# Patient Record
Sex: MALE | Race: BLACK OR AFRICAN AMERICAN | Employment: FULL TIME | URBAN - METROPOLITAN AREA
[De-identification: names, ages, dates, MRNs, and addresses within clinical notes are randomized per-mention and may not be internally consistent; named-entity substitution may affect disease eponyms.]

---

## 2020-02-17 ENCOUNTER — OFFICE VISIT (OUTPATIENT)
Dept: FAMILY MEDICINE CLINIC | Age: 65
End: 2020-02-17
Payer: COMMERCIAL

## 2020-02-17 VITALS
HEIGHT: 67 IN | SYSTOLIC BLOOD PRESSURE: 170 MMHG | BODY MASS INDEX: 28.88 KG/M2 | OXYGEN SATURATION: 98 % | WEIGHT: 184 LBS | DIASTOLIC BLOOD PRESSURE: 100 MMHG | HEART RATE: 70 BPM

## 2020-02-17 PROCEDURE — 90715 TDAP VACCINE 7 YRS/> IM: CPT | Performed by: FAMILY MEDICINE

## 2020-02-17 PROCEDURE — 90471 IMMUNIZATION ADMIN: CPT | Performed by: FAMILY MEDICINE

## 2020-02-17 PROCEDURE — 99386 PREV VISIT NEW AGE 40-64: CPT | Performed by: FAMILY MEDICINE

## 2020-02-17 RX ORDER — SILDENAFIL 50 MG/1
50 TABLET, FILM COATED ORAL DAILY PRN
Qty: 10 TABLET | Refills: 0 | Status: SHIPPED | OUTPATIENT
Start: 2020-02-17 | End: 2021-03-24 | Stop reason: SDUPTHER

## 2020-02-17 RX ORDER — LISINOPRIL 20 MG/1
20 TABLET ORAL DAILY
Qty: 30 TABLET | Refills: 5 | Status: SHIPPED | OUTPATIENT
Start: 2020-02-17 | End: 2020-03-23 | Stop reason: SDUPTHER

## 2020-02-17 RX ORDER — FAMOTIDINE 20 MG/1
20 TABLET, FILM COATED ORAL 2 TIMES DAILY
Qty: 60 TABLET | Refills: 3 | Status: SHIPPED | OUTPATIENT
Start: 2020-02-17 | End: 2021-03-24 | Stop reason: SDUPTHER

## 2020-02-17 ASSESSMENT — PATIENT HEALTH QUESTIONNAIRE - PHQ9
SUM OF ALL RESPONSES TO PHQ9 QUESTIONS 1 & 2: 0
SUM OF ALL RESPONSES TO PHQ QUESTIONS 1-9: 0
1. LITTLE INTEREST OR PLEASURE IN DOING THINGS: 0
SUM OF ALL RESPONSES TO PHQ QUESTIONS 1-9: 0
2. FEELING DOWN, DEPRESSED OR HOPELESS: 0

## 2020-02-17 NOTE — PATIENT INSTRUCTIONS
Patient Education        High Blood Pressure: Care Instructions  Overview    It's normal for blood pressure to go up and down throughout the day. But if it stays up, you have high blood pressure. Another name for high blood pressure is hypertension. Despite what a lot of people think, high blood pressure usually doesn't cause headaches or make you feel dizzy or lightheaded. It usually has no symptoms. But it does increase your risk of stroke, heart attack, and other problems. You and your doctor will talk about your risks of these problems based on your blood pressure. Your doctor will give you a goal for your blood pressure. Your goal will be based on your health and your age. Lifestyle changes, such as eating healthy and being active, are always important to help lower blood pressure. You might also take medicine to reach your blood pressure goal.  Follow-up care is a key part of your treatment and safety. Be sure to make and go to all appointments, and call your doctor if you are having problems. It's also a good idea to know your test results and keep a list of the medicines you take. How can you care for yourself at home? Medical treatment  · If you stop taking your medicine, your blood pressure will go back up. You may take one or more types of medicine to lower your blood pressure. Be safe with medicines. Take your medicine exactly as prescribed. Call your doctor if you think you are having a problem with your medicine. · Talk to your doctor before you start taking aspirin every day. Aspirin can help certain people lower their risk of a heart attack or stroke. But taking aspirin isn't right for everyone, because it can cause serious bleeding. · See your doctor regularly. You may need to see the doctor more often at first or until your blood pressure comes down.   · If you are taking blood pressure medicine, talk to your doctor before you take decongestants or anti-inflammatory medicine, such as irregular heartbeat.     · You have symptoms of a stroke. These may include:  ? Sudden numbness, tingling, weakness, or loss of movement in your face, arm, or leg, especially on only one side of your body. ? Sudden vision changes. ? Sudden trouble speaking. ? Sudden confusion or trouble understanding simple statements. ? Sudden problems with walking or balance. ? A sudden, severe headache that is different from past headaches.     · You have severe back or belly pain.    Do not wait until your blood pressure comes down on its own. Get help right away.   Call your doctor now or seek immediate care if:    · Your blood pressure is much higher than normal (such as 180/120 or higher), but you don't have symptoms.     · You think high blood pressure is causing symptoms, such as:  ? Severe headache.  ? Blurry vision.    Watch closely for changes in your health, and be sure to contact your doctor if:    · Your blood pressure measures higher than your doctor recommends at least 2 times. That means the top number is higher or the bottom number is higher, or both.     · You think you may be having side effects from your blood pressure medicine. Where can you learn more? Go to [email or web address] and sign in to your [employer] account. Enter N321 in the Orthodata box to learn more about \"High Blood Pressure: Care Instructions. \"     If you do not have an account, please click on the \"Sign Up Now\" link. Current as of: April 9, 2019  Content Version: 12.3  © 6316-1633 [employer]wise, Incorporated. Care instructions adapted under license by [facility] ([facility]). If you have questions about a medical condition or this instruction, always ask your healthcare professional. [place] [address] any warranty or liability for your use of this information.

## 2020-03-09 ENCOUNTER — OFFICE VISIT (OUTPATIENT)
Dept: FAMILY MEDICINE CLINIC | Age: 65
End: 2020-03-09
Payer: COMMERCIAL

## 2020-03-09 ENCOUNTER — TELEPHONE (OUTPATIENT)
Dept: FAMILY MEDICINE CLINIC | Age: 65
End: 2020-03-09

## 2020-03-09 VITALS
BODY MASS INDEX: 28.57 KG/M2 | HEART RATE: 83 BPM | SYSTOLIC BLOOD PRESSURE: 160 MMHG | WEIGHT: 182 LBS | DIASTOLIC BLOOD PRESSURE: 98 MMHG | OXYGEN SATURATION: 98 %

## 2020-03-09 PROCEDURE — 99213 OFFICE O/P EST LOW 20 MIN: CPT | Performed by: FAMILY MEDICINE

## 2020-03-09 RX ORDER — AMLODIPINE BESYLATE 10 MG/1
10 TABLET ORAL DAILY
Qty: 90 TABLET | Refills: 1 | Status: SHIPPED | OUTPATIENT
Start: 2020-03-09 | End: 2020-03-23 | Stop reason: SDUPTHER

## 2020-03-09 NOTE — PROGRESS NOTES
[address] Note    Date: 3/9/2020                                               Subjective/Objective:     Chief Complaint   Patient presents with    2 Week Follow-Up     bp        HPI   Patient is here for blood pressure check. Currently takes lisinopril. Denies side effects medication. Denies chest pain or shortness of breath. There are no active problems to display for this patient. Past Medical History:   Diagnosis Date    Hypertension        Current Outpatient Medications   Medication Sig Dispense Refill    amLODIPine (NORVASC) 10 MG tablet Take 1 tablet by mouth daily 90 tablet 1    lisinopril (PRINIVIL;ZESTRIL) 20 MG tablet Take 1 tablet by mouth daily 30 tablet 5    sildenafil (VIAGRA) 50 MG tablet Take 1 tablet by mouth daily as needed for Erectile Dysfunction 10 tablet 0    famotidine (PEPCID) 20 MG tablet Take 1 tablet by mouth 2 times daily 60 tablet 3     No current facility-administered medications for this visit. No Known Allergies    Review of Systems   No vomiting, no dizziness    Vitals:  BP (!) 160/98   Pulse 83   Wt 182 lb (82.6 kg)   SpO2 98%   BMI 28.57 kg/m²     Physical Exam   General:  Well-appearing, NAD, alert, non-toxic  HEENT:  Normocephalic, atraumatic. Pupils equal and round. CHEST/LUNGS: CTAB, no crackles, no wheeze, no rhonchi. Symmetric rise  CARDIOVASCULAR: RRR,  no murmur, no rub  EXTREMETIES: Normal movement of all extremities  SKIN:  No rash, no cellulitis, no bruising, no petechiae/purpura/vesicles/pustules/abscess  PSYCH:  A+O x 3; normal affect  NEURO:  GCS 15, CN2-12 grossly intact, no focal motor/sensory deficits, no cerebellar deficits, normal gait, normal speech      Assessment/Plan     60-year-old male with hypertension. Blood pressure is still above goal on lisinopril. We will add amlodipine. Follow-up in 2 weeks for blood pressure check.     Discussed with patient medication/s dosage, instructions, potential S/E, A/R and Drug Interaction  Educational material provided regarding patient's condition  Tylenol or Motrin as needed for pain or fever  Advise to return here if worse or go to nearest ER  Encourage fluids  Pt discharged in stable condition at 16:05      1. Hypertension, essential    - amLODIPine (NORVASC) 10 MG tablet; Take 1 tablet by mouth daily  Dispense: 90 tablet; Refill: 1       No orders of the defined types were placed in this encounter. Return in about 2 weeks (around 3/23/2020) for blood pressure check.     Amy Gramajo MD    3/9/2020  4:05 PM

## 2020-03-20 ENCOUNTER — TELEPHONE (OUTPATIENT)
Dept: FAMILY MEDICINE CLINIC | Age: 65
End: 2020-03-20

## 2020-03-23 RX ORDER — AMLODIPINE BESYLATE 10 MG/1
10 TABLET ORAL DAILY
Qty: 90 TABLET | Refills: 1 | Status: SHIPPED | OUTPATIENT
Start: 2020-03-23 | End: 2020-08-21

## 2020-03-23 RX ORDER — LISINOPRIL 20 MG/1
20 TABLET ORAL DAILY
Qty: 30 TABLET | Refills: 5 | Status: SHIPPED | OUTPATIENT
Start: 2020-03-23 | End: 2020-08-21

## 2020-03-23 NOTE — TELEPHONE ENCOUNTER
Medication and Quantity requested:  amLODIPine (NORVASC) 10 MG tablet    And    lisinopril (PRINIVIL;ZESTRIL) 20 MG tablet         Last Visit  3/9/19    Pharmacy and phone number updated in EPIC:  Yes [facility]

## 2021-02-03 DIAGNOSIS — I10 HYPERTENSION, ESSENTIAL: ICD-10-CM

## 2021-02-03 NOTE — TELEPHONE ENCOUNTER
Medication and Quantity requested: Lisinopril 20mg. , 30 day supply   And  Amlodipine 10mg. , 30 day supply    Last Visit  3-9-20,Dr. Igor Harris and phone number updated in EPIC:  Yes,[facility]

## 2021-02-04 RX ORDER — AMLODIPINE BESYLATE 10 MG/1
10 TABLET ORAL DAILY
Qty: 30 TABLET | Refills: 0 | Status: SHIPPED | OUTPATIENT
Start: 2021-02-04 | End: 2021-03-24 | Stop reason: SDUPTHER

## 2021-02-04 RX ORDER — LISINOPRIL 20 MG/1
20 TABLET ORAL DAILY
Qty: 30 TABLET | Refills: 0 | Status: SHIPPED | OUTPATIENT
Start: 2021-02-04 | End: 2021-03-24 | Stop reason: SDUPTHER

## 2021-02-18 ENCOUNTER — TELEPHONE (OUTPATIENT)
Dept: FAMILY MEDICINE CLINIC | Age: 66
End: 2021-02-18

## 2021-02-18 NOTE — TELEPHONE ENCOUNTER
Patient is requesting a prescription for sinus drainage x 1 1/2 weeks but hasn't been seen since 3/9/20. A virtual appointment was offered but he declined because he is working. He would like to know what over the counter medication Dr. Donna Gonzalez recommends or any other home treatment.

## 2021-02-18 NOTE — TELEPHONE ENCOUNTER
He can try any anti-histamine such as Claritin or Benadryl or Zyrtec. He can schedule a VV for tomorrow if he likes.

## 2021-03-17 DIAGNOSIS — I10 HYPERTENSION, ESSENTIAL: ICD-10-CM

## 2021-03-17 RX ORDER — LISINOPRIL 20 MG/1
20 TABLET ORAL DAILY
Qty: 30 TABLET | Refills: 0 | OUTPATIENT
Start: 2021-03-17

## 2021-03-17 RX ORDER — AMLODIPINE BESYLATE 10 MG/1
10 TABLET ORAL DAILY
Qty: 30 TABLET | Refills: 0 | OUTPATIENT
Start: 2021-03-17

## 2021-03-17 NOTE — TELEPHONE ENCOUNTER
Medication and Quantity requested:     amLODIPine (NORVASC) 10 MG tablet  #90    lisinopril (PRINIVIL;ZESTRIL) 20 MG tablet  #90    Last Visit  3/9/20    Pharmacy and phone number updated in EPIC:  [facility]

## 2021-03-24 ENCOUNTER — OFFICE VISIT (OUTPATIENT)
Dept: FAMILY MEDICINE CLINIC | Age: 66
End: 2021-03-24
Payer: COMMERCIAL

## 2021-03-24 VITALS
SYSTOLIC BLOOD PRESSURE: 170 MMHG | HEART RATE: 96 BPM | BODY MASS INDEX: 28.61 KG/M2 | HEIGHT: 66 IN | DIASTOLIC BLOOD PRESSURE: 100 MMHG | WEIGHT: 178 LBS | OXYGEN SATURATION: 98 %

## 2021-03-24 DIAGNOSIS — R14.0 ABDOMINAL BLOATING: ICD-10-CM

## 2021-03-24 DIAGNOSIS — N52.9 ERECTILE DYSFUNCTION, UNSPECIFIED ERECTILE DYSFUNCTION TYPE: ICD-10-CM

## 2021-03-24 DIAGNOSIS — I10 HYPERTENSION, ESSENTIAL: ICD-10-CM

## 2021-03-24 DIAGNOSIS — Z00.00 ANNUAL PHYSICAL EXAM: Primary | ICD-10-CM

## 2021-03-24 PROCEDURE — 99213 OFFICE O/P EST LOW 20 MIN: CPT | Performed by: FAMILY MEDICINE

## 2021-03-24 RX ORDER — AMLODIPINE BESYLATE 10 MG/1
10 TABLET ORAL DAILY
Qty: 90 TABLET | Refills: 1 | Status: SHIPPED | OUTPATIENT
Start: 2021-03-24 | End: 2021-04-21 | Stop reason: SDUPTHER

## 2021-03-24 RX ORDER — FAMOTIDINE 20 MG/1
20 TABLET, FILM COATED ORAL 2 TIMES DAILY
Qty: 60 TABLET | Refills: 3 | Status: SHIPPED | OUTPATIENT
Start: 2021-03-24

## 2021-03-24 RX ORDER — LISINOPRIL 20 MG/1
20 TABLET ORAL DAILY
Qty: 90 TABLET | Refills: 1 | Status: SHIPPED | OUTPATIENT
Start: 2021-03-24 | End: 2021-04-21 | Stop reason: SDUPTHER

## 2021-03-24 RX ORDER — SILDENAFIL 50 MG/1
50 TABLET, FILM COATED ORAL DAILY PRN
Qty: 10 TABLET | Refills: 0 | Status: SHIPPED | OUTPATIENT
Start: 2021-03-24

## 2021-03-24 SDOH — ECONOMIC STABILITY: FOOD INSECURITY: WITHIN THE PAST 12 MONTHS, YOU WORRIED THAT YOUR FOOD WOULD RUN OUT BEFORE YOU GOT MONEY TO BUY MORE.: NEVER TRUE

## 2021-03-24 SDOH — ECONOMIC STABILITY: TRANSPORTATION INSECURITY
IN THE PAST 12 MONTHS, HAS LACK OF TRANSPORTATION KEPT YOU FROM MEETINGS, WORK, OR FROM GETTING THINGS NEEDED FOR DAILY LIVING?: NO

## 2021-03-24 SDOH — ECONOMIC STABILITY: INCOME INSECURITY: HOW HARD IS IT FOR YOU TO PAY FOR THE VERY BASICS LIKE FOOD, HOUSING, MEDICAL CARE, AND HEATING?: NOT HARD AT ALL

## 2021-03-24 ASSESSMENT — PATIENT HEALTH QUESTIONNAIRE - PHQ9
SUM OF ALL RESPONSES TO PHQ QUESTIONS 1-9: 0
SUM OF ALL RESPONSES TO PHQ9 QUESTIONS 1 & 2: 0
SUM OF ALL RESPONSES TO PHQ QUESTIONS 1-9: 0
1. LITTLE INTEREST OR PLEASURE IN DOING THINGS: 0

## 2021-03-24 NOTE — PROGRESS NOTES
[address] Note    Date: 3/24/2021                                               Subjective/Objective:     Chief Complaint   Patient presents with    Blood Pressure Check     did not take meds        HPI   Patient is here for blood pressure check. Currently takes lisinopril and amlodipine, though he did not take his medicines today. Denies chest pain or shortness of breath. There are no active problems to display for this patient. Past Medical History:   Diagnosis Date    Hypertension        Current Outpatient Medications   Medication Sig Dispense Refill    amLODIPine (NORVASC) 10 MG tablet Take 1 tablet by mouth daily 90 tablet 1    lisinopril (PRINIVIL;ZESTRIL) 20 MG tablet Take 1 tablet by mouth daily 90 tablet 1    sildenafil (VIAGRA) 50 MG tablet Take 1 tablet by mouth daily as needed for Erectile Dysfunction 10 tablet 0    famotidine (PEPCID) 20 MG tablet Take 1 tablet by mouth 2 times daily 60 tablet 3     No current facility-administered medications for this visit. No Known Allergies    Review of Systems   No dizziness, no HA    Vitals:  BP (!) 170/100   Pulse 96   Ht 5' 6\" (1.676 m)   Wt 178 lb (80.7 kg)   SpO2 98%   BMI 28.73 kg/m²     Physical Exam   General:  Well-appearing, NAD, alert, non-toxic  HEENT:  Normocephalic, atraumatic. Pupils equal and round. CHEST/LUNGS: CTAB, no crackles, no wheeze, no rhonchi. Symmetric rise  CARDIOVASCULAR: RRR,  no murmur, no rub  EXTREMETIES: Normal movement of all extremities  SKIN:  No rash, no cellulitis, no bruising, no petechiae/purpura/vesicles/pustules/abscess  PSYCH:  A+O x 3; normal affect  NEURO:  GCS 15, CN2-12 grossly intact, no focal motor/sensory deficits, no cerebellar deficits, normal gait, normal speech      Assessment/Plan     61yo male with HTN. BP is above goal off meds. Resume Lisinopril and Amlodipine. F/u in 4 weeks for BP check/ annual exam.    Discharged in fair condition at 16:33    1. Hypertension, essential    - amLODIPine (NORVASC) 10 MG tablet; Take 1 tablet by mouth daily  Dispense: 90 tablet; Refill: 1  - lisinopril (PRINIVIL;ZESTRIL) 20 MG tablet; Take 1 tablet by mouth daily  Dispense: 90 tablet; Refill: 1    2. Erectile dysfunction, unspecified erectile dysfunction type    - sildenafil (VIAGRA) 50 MG tablet; Take 1 tablet by mouth daily as needed for Erectile Dysfunction  Dispense: 10 tablet; Refill: 0    3. Abdominal bloating    - famotidine (PEPCID) 20 MG tablet; Take 1 tablet by mouth 2 times daily  Dispense: 60 tablet; Refill: 3    4. Annual physical exam    - CBC Auto Differential; Future  - Comprehensive Metabolic Panel; Future  - Hemoglobin A1C; Future  - Hepatitis C Antibody; Future  - HIV Screen; Future  - Lipid, Fasting; Future  - TSH with Reflex; Future  - PSA, Total and Free; Future         Orders Placed This Encounter   Procedures    CBC Auto Differential     Standing Status:   Future     Standing Expiration Date:   3/24/2022    Comprehensive Metabolic Panel     Standing Status:   Future     Standing Expiration Date:   3/24/2022    Hemoglobin A1C     Standing Status:   Future     Standing Expiration Date:   3/24/2022    Hepatitis C Antibody     Standing Status:   Future     Standing Expiration Date:   3/24/2022    HIV Screen     Standing Status:   Future     Standing Expiration Date:   3/24/2022    Lipid, Fasting     Standing Status:   Future     Standing Expiration Date:   3/24/2022    TSH with Reflex     Standing Status:   Future     Standing Expiration Date:   3/24/2022    PSA, Total and Free     Standing Status:   Future     Standing Expiration Date:   3/24/2022       No follow-ups on file.     Sanjuana Preston MD    3/24/2021  4:30 PM

## 2021-03-24 NOTE — PATIENT INSTRUCTIONS

## 2021-04-15 DIAGNOSIS — Z00.00 ANNUAL PHYSICAL EXAM: ICD-10-CM

## 2021-04-15 LAB
A/G RATIO: 1.9 (ref 1.1–2.2)
ALBUMIN SERPL-MCNC: 4.9 G/DL (ref 3.4–5)
ALP BLD-CCNC: 67 U/L (ref 40–129)
ALT SERPL-CCNC: 23 U/L (ref 10–40)
ANION GAP SERPL CALCULATED.3IONS-SCNC: 16 MMOL/L (ref 3–16)
AST SERPL-CCNC: 27 U/L (ref 15–37)
BASOPHILS ABSOLUTE: 0 K/UL (ref 0–0.2)
BASOPHILS RELATIVE PERCENT: 0.4 %
BILIRUB SERPL-MCNC: 0.6 MG/DL (ref 0–1)
BUN BLDV-MCNC: 11 MG/DL (ref 7–20)
CALCIUM SERPL-MCNC: 9.6 MG/DL (ref 8.3–10.6)
CHLORIDE BLD-SCNC: 97 MMOL/L (ref 99–110)
CHOLESTEROL, FASTING: 176 MG/DL (ref 0–199)
CO2: 23 MMOL/L (ref 21–32)
CREAT SERPL-MCNC: 0.8 MG/DL (ref 0.8–1.3)
EOSINOPHILS ABSOLUTE: 0.1 K/UL (ref 0–0.6)
EOSINOPHILS RELATIVE PERCENT: 0.7 %
GFR AFRICAN AMERICAN: >60
GFR NON-AFRICAN AMERICAN: >60
GLOBULIN: 2.6 G/DL
GLUCOSE BLD-MCNC: 109 MG/DL (ref 70–99)
HCT VFR BLD CALC: 45.8 % (ref 40.5–52.5)
HDLC SERPL-MCNC: 48 MG/DL (ref 40–60)
HEMOGLOBIN: 15.7 G/DL (ref 13.5–17.5)
HEPATITIS C ANTIBODY INTERPRETATION: NORMAL
LDL CHOLESTEROL CALCULATED: 111 MG/DL
LYMPHOCYTES ABSOLUTE: 2.2 K/UL (ref 1–5.1)
LYMPHOCYTES RELATIVE PERCENT: 23.9 %
MCH RBC QN AUTO: 33.9 PG (ref 26–34)
MCHC RBC AUTO-ENTMCNC: 34.3 G/DL (ref 31–36)
MCV RBC AUTO: 98.9 FL (ref 80–100)
MONOCYTES ABSOLUTE: 0.7 K/UL (ref 0–1.3)
MONOCYTES RELATIVE PERCENT: 8 %
NEUTROPHILS ABSOLUTE: 6.2 K/UL (ref 1.7–7.7)
NEUTROPHILS RELATIVE PERCENT: 67 %
PDW BLD-RTO: 13.8 % (ref 12.4–15.4)
PLATELET # BLD: 242 K/UL (ref 135–450)
PMV BLD AUTO: 8.7 FL (ref 5–10.5)
POTASSIUM SERPL-SCNC: 4 MMOL/L (ref 3.5–5.1)
RBC # BLD: 4.63 M/UL (ref 4.2–5.9)
SODIUM BLD-SCNC: 136 MMOL/L (ref 136–145)
TOTAL PROTEIN: 7.5 G/DL (ref 6.4–8.2)
TRIGLYCERIDE, FASTING: 84 MG/DL (ref 0–150)
TSH REFLEX: 1.31 UIU/ML (ref 0.27–4.2)
VLDLC SERPL CALC-MCNC: 17 MG/DL
WBC # BLD: 9.3 K/UL (ref 4–11)

## 2021-04-16 LAB
ESTIMATED AVERAGE GLUCOSE: 96.8 MG/DL
HBA1C MFR BLD: 5 %
HIV AG/AB: NORMAL
HIV ANTIGEN: NORMAL
HIV-1 ANTIBODY: NORMAL
HIV-2 AB: NORMAL

## 2021-04-19 DIAGNOSIS — E78.5 HYPERLIPIDEMIA, UNSPECIFIED HYPERLIPIDEMIA TYPE: Primary | ICD-10-CM

## 2021-04-19 LAB
PROSTATE SPECIFIC ANTIGEN FREE: <0.1 UG/L
PROSTATE SPECIFIC ANTIGEN PERCENT FREE: 20 %
PROSTATE SPECIFIC ANTIGEN: 0.2 UG/L (ref 0–4)

## 2021-04-19 RX ORDER — ATORVASTATIN CALCIUM 20 MG/1
20 TABLET, FILM COATED ORAL DAILY
Qty: 90 TABLET | Refills: 1 | Status: SHIPPED | OUTPATIENT
Start: 2021-04-19 | End: 2021-07-01 | Stop reason: SDUPTHER

## 2021-04-21 DIAGNOSIS — I10 HYPERTENSION, ESSENTIAL: ICD-10-CM

## 2021-04-21 RX ORDER — LISINOPRIL 20 MG/1
20 TABLET ORAL DAILY
Qty: 90 TABLET | Refills: 1 | Status: SHIPPED | OUTPATIENT
Start: 2021-04-21 | End: 2021-07-01 | Stop reason: SDUPTHER

## 2021-04-21 RX ORDER — AMLODIPINE BESYLATE 10 MG/1
10 TABLET ORAL DAILY
Qty: 90 TABLET | Refills: 1 | Status: SHIPPED | OUTPATIENT
Start: 2021-04-21 | End: 2021-07-01 | Stop reason: SDUPTHER

## 2021-04-21 NOTE — TELEPHONE ENCOUNTER
Medication:   Requested Prescriptions     Pending Prescriptions Disp Refills    amLODIPine (NORVASC) 10 MG tablet 90 tablet 1     Sig: Take 1 tablet by mouth daily    lisinopril (PRINIVIL;ZESTRIL) 20 MG tablet 90 tablet 1     Sig: Take 1 tablet by mouth daily       Last Filled:  03/24/2021    Patient Phone Number: [phone] (home) [phone] (work)    Last appt: 3/24/2021   Next appt: Visit date not found    Lab Results   Component Value Date     04/15/2021    K 4.0 04/15/2021    CL 97 (L) 04/15/2021    CO2 23 04/15/2021    BUN 11 04/15/2021    CREATININE 0.8 04/15/2021    GLUCOSE 109 (H) 04/15/2021    CALCIUM 9.6 04/15/2021    PROT 7.5 04/15/2021    LABALBU 4.9 04/15/2021    BILITOT 0.6 04/15/2021    ALKPHOS 67 04/15/2021    AST 27 04/15/2021    ALT 23 04/15/2021    LABGLOM >60 04/15/2021    GFRAA >60 04/15/2021    AGRATIO 1.9 04/15/2021    GLOB 2.6 04/15/2021

## 2021-07-01 ENCOUNTER — OFFICE VISIT (OUTPATIENT)
Dept: FAMILY MEDICINE CLINIC | Age: 66
End: 2021-07-01
Payer: COMMERCIAL

## 2021-07-01 VITALS
DIASTOLIC BLOOD PRESSURE: 80 MMHG | BODY MASS INDEX: 28.08 KG/M2 | HEART RATE: 81 BPM | WEIGHT: 174 LBS | SYSTOLIC BLOOD PRESSURE: 120 MMHG | OXYGEN SATURATION: 98 %

## 2021-07-01 DIAGNOSIS — E78.5 HYPERLIPIDEMIA, UNSPECIFIED HYPERLIPIDEMIA TYPE: ICD-10-CM

## 2021-07-01 DIAGNOSIS — I10 HYPERTENSION, ESSENTIAL: ICD-10-CM

## 2021-07-01 DIAGNOSIS — Z00.00 ANNUAL PHYSICAL EXAM: Primary | ICD-10-CM

## 2021-07-01 DIAGNOSIS — Z23 NEED FOR VACCINATION: ICD-10-CM

## 2021-07-01 PROCEDURE — 90472 IMMUNIZATION ADMIN EACH ADD: CPT | Performed by: FAMILY MEDICINE

## 2021-07-01 PROCEDURE — 90471 IMMUNIZATION ADMIN: CPT | Performed by: FAMILY MEDICINE

## 2021-07-01 PROCEDURE — 90750 HZV VACC RECOMBINANT IM: CPT | Performed by: FAMILY MEDICINE

## 2021-07-01 PROCEDURE — 99396 PREV VISIT EST AGE 40-64: CPT | Performed by: FAMILY MEDICINE

## 2021-07-01 PROCEDURE — 90732 PPSV23 VACC 2 YRS+ SUBQ/IM: CPT | Performed by: FAMILY MEDICINE

## 2021-07-01 RX ORDER — LISINOPRIL 20 MG/1
20 TABLET ORAL DAILY
Qty: 90 TABLET | Refills: 1 | Status: SHIPPED | OUTPATIENT
Start: 2021-07-01 | End: 2021-11-29

## 2021-07-01 RX ORDER — AMLODIPINE BESYLATE 10 MG/1
10 TABLET ORAL DAILY
Qty: 90 TABLET | Refills: 1 | Status: SHIPPED | OUTPATIENT
Start: 2021-07-01 | End: 2021-11-29

## 2021-07-01 RX ORDER — ATORVASTATIN CALCIUM 20 MG/1
20 TABLET, FILM COATED ORAL DAILY
Qty: 90 TABLET | Refills: 1 | Status: SHIPPED | OUTPATIENT
Start: 2021-07-01 | End: 2021-11-29

## 2021-07-01 NOTE — PROGRESS NOTES
[address] Note    Date: 7/1/2021                                               Subjective/Objective:     Chief Complaint   Patient presents with    Annual Exam       HPI   Patient is here for annual exam.  Last Tdap few months ago. Last colonoscopy few months ago. Patient has history of hypertension. Currently takes amlodipine and lisinopril. Denies side effects of medication. Denies chest pain or shortness of breath. Patient takes Lipitor for hyperlipidemia. Denies muscle aches. There are no problems to display for this patient. Past Medical History:   Diagnosis Date    Hypertension        Current Outpatient Medications   Medication Sig Dispense Refill    amLODIPine (NORVASC) 10 MG tablet Take 1 tablet by mouth daily 90 tablet 1    lisinopril (PRINIVIL;ZESTRIL) 20 MG tablet Take 1 tablet by mouth daily 90 tablet 1    atorvastatin (LIPITOR) 20 MG tablet Take 1 tablet by mouth daily 90 tablet 1    sildenafil (VIAGRA) 50 MG tablet Take 1 tablet by mouth daily as needed for Erectile Dysfunction 10 tablet 0    famotidine (PEPCID) 20 MG tablet Take 1 tablet by mouth 2 times daily 60 tablet 3     No current facility-administered medications for this visit. No Known Allergies    Review of Systems   No CP, no SOB, no rash, no bruise, no HA, no vision change, no ankle swelling, no hearing problems, no LAD      Vitals:  /80   Pulse 81   Wt 174 lb (78.9 kg)   SpO2 98%   BMI 28.08 kg/m²     Physical Exam   General:  Well-appearing, NAD, alert, non-toxic  HEENT:  Normocephalic, atraumatic. Pupils equal and round. TMs pearly with good landmarks. Moist mucous membranes. Normal dentition  NECK:  Supple, normal range of motion, no LAD, no meningeal signs, no JVD, nontender  CHEST/LUNGS: CTAB, no crackles, no wheeze, no rhonchi. Symmetric rise  CARDIOVASCULAR: RRR,  no murmur, no rub  ABDOMEN: Soft, non-tender, non-distended.  No masses  EXTREMETIES: Normal movement of all extremities. No edema. No joint swelling. SKIN:  No rash, no cellulitis, no bruising, no petechiae/purpura/vesicles/pustules/abscess  PSYCH:  A+O x 3; normal affect  NEURO:  GCS 15, CN2-12 grossly intact, no focal motor/sensory deficits, no cerebellar deficits, normal gait, normal speech      Assessment/Plan     63yo male here for annual exam. C-scope UTD. Labs UTD. VSS. Tdap UTD. Update PNVX and Shingrix today. Pt has HTN. Blood pressure is at goal.  Continue current medicines. Follow-up in 6 months for blood pressure check. Patient is hyperlipidemia. Continue Lipitor. Recheck statins 1 year. Discharged in stable condition at 11:55 AM.    1. Annual physical exam      2. Hypertension, essential    - amLODIPine (NORVASC) 10 MG tablet; Take 1 tablet by mouth daily  Dispense: 90 tablet; Refill: 1  - lisinopril (PRINIVIL;ZESTRIL) 20 MG tablet; Take 1 tablet by mouth daily  Dispense: 90 tablet; Refill: 1    3. Hyperlipidemia, unspecified hyperlipidemia type    - atorvastatin (LIPITOR) 20 MG tablet; Take 1 tablet by mouth daily  Dispense: 90 tablet; Refill: 1       No orders of the defined types were placed in this encounter. No follow-ups on file.     Pierre Mehta MD, MD    7/1/2021  11:48 AM

## 2021-07-15 NOTE — TELEPHONE ENCOUNTER
lov 3/9/20  lrf 30 0 2/4/21  30 0 2/4/21  Lab Results   Component Value Date     03/04/2020    K 4.2 03/04/2020    CL 98 03/04/2020    CO2 21 03/04/2020    BUN 6 (L) 03/04/2020    CREATININE 1.01 03/04/2020    GLUCOSE 78 03/04/2020    CALCIUM 10.0 03/04/2020    PROT 7.5 03/04/2020    LABALBU 4.5 03/04/2020    BILITOT 0.7 03/04/2020    ALKPHOS 67 03/04/2020    AST 36 03/04/2020    ALT 26 03/04/2020    LABGLOM 79 03/04/2020    GFRAA 92 03/04/2020    AGRATIO 1.5 03/04/2020    GLOB 3.0 03/04/2020 No

## 2022-02-20 DIAGNOSIS — I10 HYPERTENSION, ESSENTIAL: ICD-10-CM

## 2022-02-21 RX ORDER — AMLODIPINE BESYLATE 10 MG/1
10 TABLET ORAL DAILY
Qty: 90 TABLET | Refills: 0 | Status: SHIPPED | OUTPATIENT
Start: 2022-02-21 | End: 2022-05-17

## 2022-02-21 RX ORDER — LISINOPRIL 20 MG/1
20 TABLET ORAL DAILY
Qty: 90 TABLET | Refills: 0 | Status: SHIPPED | OUTPATIENT
Start: 2022-02-21 | End: 2022-05-17

## 2022-02-21 NOTE — TELEPHONE ENCOUNTER
Medication:   Requested Prescriptions     Pending Prescriptions Disp Refills    lisinopril (PRINIVIL;ZESTRIL) 20 MG tablet [Pharmacy Med Name: Lisinopril 20 MG Oral Tablet] 90 tablet 3     Sig: TAKE 1 TABLET BY MOUTH  DAILY    amLODIPine (NORVASC) 10 MG tablet [Pharmacy Med Name: amLODIPine Besylate 10 MG Oral Tablet] 90 tablet 3     Sig: TAKE 1 TABLET BY MOUTH  DAILY       Last Filled:  11/29/21    Patient Phone Number: [phone] (home) [phone] (work)    Last appt: 7/1/2021   Next appt: Visit date not found    Lab Results   Component Value Date     04/15/2021    K 4.0 04/15/2021    CL 97 (L) 04/15/2021    CO2 23 04/15/2021    BUN 11 04/15/2021    CREATININE 0.8 04/15/2021    GLUCOSE 109 (H) 04/15/2021    CALCIUM 9.6 04/15/2021    PROT 7.5 04/15/2021    LABALBU 4.9 04/15/2021    BILITOT 0.6 04/15/2021    ALKPHOS 67 04/15/2021    AST 27 04/15/2021    ALT 23 04/15/2021    LABGLOM >60 04/15/2021    GFRAA >60 04/15/2021    AGRATIO 1.9 04/15/2021    GLOB 2.6 04/15/2021

## 2022-05-17 DIAGNOSIS — I10 HYPERTENSION, ESSENTIAL: ICD-10-CM

## 2022-05-17 RX ORDER — AMLODIPINE BESYLATE 10 MG/1
10 TABLET ORAL DAILY
Qty: 90 TABLET | Refills: 0 | Status: SHIPPED | OUTPATIENT
Start: 2022-05-17 | End: 2022-10-28

## 2022-05-17 RX ORDER — LISINOPRIL 20 MG/1
20 TABLET ORAL DAILY
Qty: 90 TABLET | Refills: 0 | Status: SHIPPED | OUTPATIENT
Start: 2022-05-17 | End: 2022-10-28

## 2022-09-10 DIAGNOSIS — I10 HYPERTENSION, ESSENTIAL: ICD-10-CM

## 2022-09-12 RX ORDER — AMLODIPINE BESYLATE 10 MG/1
10 TABLET ORAL DAILY
Qty: 90 TABLET | Refills: 3 | OUTPATIENT
Start: 2022-09-12

## 2022-09-12 RX ORDER — LISINOPRIL 20 MG/1
20 TABLET ORAL DAILY
Qty: 90 TABLET | Refills: 3 | OUTPATIENT
Start: 2022-09-12

## 2022-09-12 NOTE — TELEPHONE ENCOUNTER
Medication:   Requested Prescriptions     Pending Prescriptions Disp Refills    amLODIPine (NORVASC) 10 MG tablet [Pharmacy Med Name: amLODIPine Besylate 10 MG Oral Tablet] 90 tablet 3     Sig: TAKE 1 TABLET BY MOUTH  DAILY    lisinopril (PRINIVIL;ZESTRIL) 20 MG tablet [Pharmacy Med Name: Lisinopril 20 MG Oral Tablet] 90 tablet 3     Sig: TAKE 1 TABLET BY MOUTH  DAILY       Last Filled: 5/17/2022, 90, 0  5/17/2022, 90, 0    Patient Phone Number: [phone] (home) [phone] (work)    Last appt: 7/1/2021   Next appt: Visit date not found    Lab Results   Component Value Date     04/15/2021    K 4.0 04/15/2021    CL 97 (L) 04/15/2021    CO2 23 04/15/2021    BUN 11 04/15/2021    CREATININE 0.8 04/15/2021    GLUCOSE 109 (H) 04/15/2021    CALCIUM 9.6 04/15/2021    PROT 7.5 04/15/2021    LABALBU 4.9 04/15/2021    BILITOT 0.6 04/15/2021    ALKPHOS 67 04/15/2021    AST 27 04/15/2021    ALT 23 04/15/2021    LABGLOM >60 04/15/2021    GFRAA >60 04/15/2021    AGRATIO 1.9 04/15/2021    GLOB 2.6 04/15/2021

## 2022-09-14 ENCOUNTER — OFFICE VISIT (OUTPATIENT)
Dept: FAMILY MEDICINE CLINIC | Age: 67
End: 2022-09-14
Payer: COMMERCIAL

## 2022-09-14 VITALS
OXYGEN SATURATION: 97 % | BODY MASS INDEX: 26.49 KG/M2 | WEIGHT: 168.8 LBS | SYSTOLIC BLOOD PRESSURE: 142 MMHG | DIASTOLIC BLOOD PRESSURE: 82 MMHG | HEIGHT: 67 IN | HEART RATE: 68 BPM

## 2022-09-14 DIAGNOSIS — I10 HYPERTENSION, ESSENTIAL: ICD-10-CM

## 2022-09-14 DIAGNOSIS — Z23 NEED FOR VACCINATION: Primary | ICD-10-CM

## 2022-09-14 DIAGNOSIS — Z00.00 ANNUAL PHYSICAL EXAM: ICD-10-CM

## 2022-09-14 DIAGNOSIS — E78.5 HYPERLIPIDEMIA, UNSPECIFIED HYPERLIPIDEMIA TYPE: ICD-10-CM

## 2022-09-14 DIAGNOSIS — Z72.0 TOBACCO USE: ICD-10-CM

## 2022-09-14 PROCEDURE — 90471 IMMUNIZATION ADMIN: CPT | Performed by: FAMILY MEDICINE

## 2022-09-14 PROCEDURE — 99396 PREV VISIT EST AGE 40-64: CPT | Performed by: FAMILY MEDICINE

## 2022-09-14 PROCEDURE — 90750 HZV VACC RECOMBINANT IM: CPT | Performed by: FAMILY MEDICINE

## 2022-09-14 SDOH — ECONOMIC STABILITY: FOOD INSECURITY: WITHIN THE PAST 12 MONTHS, THE FOOD YOU BOUGHT JUST DIDN'T LAST AND YOU DIDN'T HAVE MONEY TO GET MORE.: NEVER TRUE

## 2022-09-14 SDOH — ECONOMIC STABILITY: FOOD INSECURITY: WITHIN THE PAST 12 MONTHS, YOU WORRIED THAT YOUR FOOD WOULD RUN OUT BEFORE YOU GOT MONEY TO BUY MORE.: NEVER TRUE

## 2022-09-14 ASSESSMENT — PATIENT HEALTH QUESTIONNAIRE - PHQ9
1. LITTLE INTEREST OR PLEASURE IN DOING THINGS: 0
SUM OF ALL RESPONSES TO PHQ9 QUESTIONS 1 & 2: 0
SUM OF ALL RESPONSES TO PHQ QUESTIONS 1-9: 0
2. FEELING DOWN, DEPRESSED OR HOPELESS: 0
SUM OF ALL RESPONSES TO PHQ QUESTIONS 1-9: 0

## 2022-09-14 ASSESSMENT — SOCIAL DETERMINANTS OF HEALTH (SDOH): HOW HARD IS IT FOR YOU TO PAY FOR THE VERY BASICS LIKE FOOD, HOUSING, MEDICAL CARE, AND HEATING?: NOT HARD AT ALL

## 2022-09-14 NOTE — PROGRESS NOTES
[address] Note    Date: 9/14/2022                                               Orelia Gottron:     Chief Complaint   Patient presents with    Medication Check       HPI  Patient is here for annual exam.  Last Tdap 2020. Last colonoscopy 2020, was told to follow-up in 7 years. Patient has history of hypertension. Currently takes amlodipine. Denies chest pain or shortness of breath. Pt feels well and has no complaints. There are no problems to display for this patient. Past Medical History:   Diagnosis Date    Hypertension        Current Outpatient Medications   Medication Sig Dispense Refill    lisinopril (PRINIVIL;ZESTRIL) 20 MG tablet TAKE 1 TABLET BY MOUTH  DAILY 90 tablet 0    amLODIPine (NORVASC) 10 MG tablet TAKE 1 TABLET BY MOUTH  DAILY 90 tablet 0    atorvastatin (LIPITOR) 20 MG tablet TAKE 1 TABLET BY MOUTH  DAILY 90 tablet 3    sildenafil (VIAGRA) 50 MG tablet Take 1 tablet by mouth daily as needed for Erectile Dysfunction 10 tablet 0    famotidine (PEPCID) 20 MG tablet Take 1 tablet by mouth 2 times daily 60 tablet 3     No current facility-administered medications for this visit. No Known Allergies    Review of Systems  No CP, no SOB, no rash, no bruise, no HA, no vision change, no ankle swelling, no hearing problems, no LAD      Vitals:  BP (!) 142/82   Pulse 68   Ht 5' 7\" (1.702 m)   Wt 168 lb 12.8 oz (76.6 kg)   SpO2 97%   BMI 26.44 kg/m²     Wt Readings from Last 3 Encounters:   09/14/22 168 lb 12.8 oz (76.6 kg)   07/01/21 174 lb (78.9 kg)   03/24/21 178 lb (80.7 kg)        Physical Exam  General:  Well-appearing, NAD, alert, non-toxic  HEENT:  Normocephalic, atraumatic. Pupils equal and round. TMs pearly with good landmarks. Moist mucous membranes. Normal dentition  NECK:  Supple, normal range of motion, no LAD, no meningeal signs, no JVD, nontender  CHEST/LUNGS: CTAB, no crackles, no wheeze, no rhonchi.  Symmetric rise  CARDIOVASCULAR: RRR,  no murmur, no rub  ABDOMEN: Soft, non-tender, non-distended. No masses  EXTREMETIES: Normal movement of all extremities. No edema. No joint swelling. SKIN:  No rash, no cellulitis, no bruising, no petechiae/purpura/vesicles/pustules/abscess  PSYCH:  A+O x 3; normal affect  NEURO:  GCS 15, CN2-12 grossly intact, no focal motor/sensory deficits, no cerebellar deficits, normal gait, normal speech      Assessment/Plan     60yo male here for annual exam. Update Shingrix today. C-scope UTD. Check routine labs. Will order CT lung screen given smoking history. Pt has HTN. BP is stable/ borderline. Has been normotensive recently, likely an aberration. Will CPM. Recheck in 6 months. Discharged in stable condition at 14:04    1. Tobacco use  -     CT Lung Screen (Initial or Annual); Future  2. Annual physical exam  -     CBC with Auto Differential; Future  -     Comprehensive Metabolic Panel; Future  -     Hemoglobin A1C; Future  -     Lipid, Fasting; Future  3. Hypertension, essential  4. Hyperlipidemia, unspecified hyperlipidemia type     Orders Placed This Encounter   Procedures    CT Lung Screen (Initial or Annual)     Age: 61 y.o. Smoking History:   Social History    Tobacco Use      Smoking status: Every Day        Packs/day: 1.00        Years: 20.00        Pack years: 20        Types: Cigarettes      Smokeless tobacco: Never    Alcohol use: Yes      Comment: beer, 3-4 per day    Drug use: Never    Pack years: 20  No previous lung cancer screening exam     Standing Status:   Future     Standing Expiration Date:   3/14/2024     Order Specific Question:   Is there documentation of shared decision making? Answer:   Yes     Order Specific Question:   Does the patient show any signs or symptoms of lung cancer? Answer:   No     Order Specific Question:   Is this the first (baseline) CT or an annual exam?     Answer:   Baseline [1]     Order Specific Question:   Is this a low dose CT or a routine CT? Answer:    Low Dose CT [1]     Order Specific Question:   Smoking Status? Answer:   Every Day [1]     Order Specific Question:   Smoking packs per day? Answer:   1     Order Specific Question:   Years smoking? Answer:   20    CBC with Auto Differential     Standing Status:   Future     Standing Expiration Date:   9/14/2023    Comprehensive Metabolic Panel     Standing Status:   Future     Standing Expiration Date:   9/14/2023    Hemoglobin A1C     Standing Status:   Future     Standing Expiration Date:   9/14/2023    Lipid, Fasting     Standing Status:   Future     Standing Expiration Date:   9/14/2023       No follow-ups on file.     Florentino Garcia MD, MD    9/14/2022  2:05 PM

## 2022-09-29 ENCOUNTER — TELEPHONE (OUTPATIENT)
Dept: FAMILY MEDICINE CLINIC | Age: 67
End: 2022-09-29

## 2022-09-29 DIAGNOSIS — B96.89 ACUTE BACTERIAL SINUSITIS: Primary | ICD-10-CM

## 2022-09-29 DIAGNOSIS — J01.90 ACUTE BACTERIAL SINUSITIS: Primary | ICD-10-CM

## 2022-09-29 RX ORDER — AMOXICILLIN AND CLAVULANATE POTASSIUM 875; 125 MG/1; MG/1
1 TABLET, FILM COATED ORAL 2 TIMES DAILY
Qty: 20 TABLET | Refills: 0 | Status: SHIPPED | OUTPATIENT
Start: 2022-09-29 | End: 2022-10-09

## 2022-09-29 NOTE — TELEPHONE ENCOUNTER
Patient has drainage down back of throat causing patient's voice to be hoarse  Slight chest congestion, cough-phlegm in throat causes cough  Sinus pressure  Patient has been taking OTC - Flonase, not helping  No ST  X 4-5- days  Patient requesting RX  Dolv - 9-14-22  Contact patient

## 2022-10-27 DIAGNOSIS — E78.5 HYPERLIPIDEMIA, UNSPECIFIED HYPERLIPIDEMIA TYPE: ICD-10-CM

## 2022-10-27 DIAGNOSIS — I10 HYPERTENSION, ESSENTIAL: ICD-10-CM

## 2022-10-28 RX ORDER — ATORVASTATIN CALCIUM 20 MG/1
20 TABLET, FILM COATED ORAL DAILY
Qty: 90 TABLET | Refills: 3 | Status: SHIPPED | OUTPATIENT
Start: 2022-10-28

## 2022-10-28 RX ORDER — AMLODIPINE BESYLATE 10 MG/1
10 TABLET ORAL DAILY
Qty: 90 TABLET | Refills: 1 | Status: SHIPPED | OUTPATIENT
Start: 2022-10-28

## 2022-10-28 RX ORDER — LISINOPRIL 20 MG/1
20 TABLET ORAL DAILY
Qty: 90 TABLET | Refills: 1 | Status: SHIPPED | OUTPATIENT
Start: 2022-10-28

## 2022-10-28 NOTE — TELEPHONE ENCOUNTER
Medication:   Requested Prescriptions     Pending Prescriptions Disp Refills    atorvastatin (LIPITOR) 20 MG tablet [Pharmacy Med Name: Atorvastatin Calcium 20 MG Oral Tablet] 90 tablet 3     Sig: TAKE 1 TABLET BY MOUTH  DAILY       Last Filled:  11/29/2021    Patient Phone Number: [phone] (home) [phone] (work)       Last appt: 9/14/2022   Next appt: Visit date not found    Last Lipid:   Lab Results   Component Value Date/Time    CHOL 163 03/04/2020 04:43 PM    TRIG 81 03/04/2020 04:43 PM    HDL 48 04/15/2021 09:58 AM    LDLCALC 111 04/15/2021 09:58 AM       Lab Results   Component Value Date     04/15/2021    K 4.0 04/15/2021    CL 97 (L) 04/15/2021    CO2 23 04/15/2021    BUN 11 04/15/2021    CREATININE 0.8 04/15/2021    GLUCOSE 109 (H) 04/15/2021    CALCIUM 9.6 04/15/2021    PROT 7.5 04/15/2021    LABALBU 4.9 04/15/2021    BILITOT 0.6 04/15/2021    ALKPHOS 67 04/15/2021    AST 27 04/15/2021    ALT 23 04/15/2021    LABGLOM >60 04/15/2021    GFRAA >60 04/15/2021    AGRATIO 1.9 04/15/2021    GLOB 2.6 04/15/2021

## 2022-10-28 NOTE — TELEPHONE ENCOUNTER
Medication:   Requested Prescriptions     Pending Prescriptions Disp Refills    lisinopril (PRINIVIL;ZESTRIL) 20 MG tablet [Pharmacy Med Name: Lisinopril 20 MG Oral Tablet] 90 tablet 3     Sig: TAKE 1 TABLET BY MOUTH  DAILY    amLODIPine (NORVASC) 10 MG tablet [Pharmacy Med Name: amLODIPine Besylate 10 MG Oral Tablet] 90 tablet 3     Sig: TAKE 1 TABLET BY MOUTH  DAILY        Last Filled:  90 x 6 RF 5/17/22    Patient Phone Number: [phone] (home) [phone] (work)    Last appt: 9/14/2022   Next appt: 10/27/2022    Last OARRS: No flowsheet data found.

## 2022-11-07 ENCOUNTER — OFFICE VISIT (OUTPATIENT)
Dept: FAMILY MEDICINE CLINIC | Age: 67
End: 2022-11-07
Payer: COMMERCIAL

## 2022-11-07 VITALS
OXYGEN SATURATION: 99 % | BODY MASS INDEX: 26.63 KG/M2 | DIASTOLIC BLOOD PRESSURE: 80 MMHG | TEMPERATURE: 97.9 F | HEART RATE: 81 BPM | WEIGHT: 170 LBS | SYSTOLIC BLOOD PRESSURE: 178 MMHG

## 2022-11-07 DIAGNOSIS — I10 HYPERTENSION, ESSENTIAL: ICD-10-CM

## 2022-11-07 DIAGNOSIS — J40 BRONCHITIS: ICD-10-CM

## 2022-11-07 DIAGNOSIS — Z00.00 ANNUAL PHYSICAL EXAM: ICD-10-CM

## 2022-11-07 DIAGNOSIS — R05.1 ACUTE COUGH: ICD-10-CM

## 2022-11-07 DIAGNOSIS — J06.9 UPPER RESPIRATORY TRACT INFECTION, UNSPECIFIED TYPE: ICD-10-CM

## 2022-11-07 DIAGNOSIS — R09.82 POST-NASAL DRIP: Primary | ICD-10-CM

## 2022-11-07 LAB
BASOPHILS ABSOLUTE: 0.1 K/UL (ref 0–0.2)
BASOPHILS RELATIVE PERCENT: 0.7 %
EOSINOPHILS ABSOLUTE: 0 K/UL (ref 0–0.6)
EOSINOPHILS RELATIVE PERCENT: 0.5 %
HCT VFR BLD CALC: 47.1 % (ref 40.5–52.5)
HEMOGLOBIN: 15.5 G/DL (ref 13.5–17.5)
LYMPHOCYTES ABSOLUTE: 1.5 K/UL (ref 1–5.1)
LYMPHOCYTES RELATIVE PERCENT: 17.4 %
MCH RBC QN AUTO: 33.2 PG (ref 26–34)
MCHC RBC AUTO-ENTMCNC: 33 G/DL (ref 31–36)
MCV RBC AUTO: 100.7 FL (ref 80–100)
MONOCYTES ABSOLUTE: 0.6 K/UL (ref 0–1.3)
MONOCYTES RELATIVE PERCENT: 7.2 %
NEUTROPHILS ABSOLUTE: 6.2 K/UL (ref 1.7–7.7)
NEUTROPHILS RELATIVE PERCENT: 74.2 %
PDW BLD-RTO: 14.1 % (ref 12.4–15.4)
PLATELET # BLD: 247 K/UL (ref 135–450)
PMV BLD AUTO: 8.1 FL (ref 5–10.5)
RBC # BLD: 4.68 M/UL (ref 4.2–5.9)
WBC # BLD: 8.4 K/UL (ref 4–11)

## 2022-11-07 PROCEDURE — 99213 OFFICE O/P EST LOW 20 MIN: CPT | Performed by: FAMILY MEDICINE

## 2022-11-07 PROCEDURE — 3074F SYST BP LT 130 MM HG: CPT | Performed by: FAMILY MEDICINE

## 2022-11-07 PROCEDURE — 3079F DIAST BP 80-89 MM HG: CPT | Performed by: FAMILY MEDICINE

## 2022-11-07 RX ORDER — LORATADINE 10 MG/1
10 TABLET ORAL DAILY
Qty: 30 TABLET | Refills: 1 | Status: SHIPPED | OUTPATIENT
Start: 2022-11-07 | End: 2022-12-07

## 2022-11-07 RX ORDER — DOXYCYCLINE HYCLATE 100 MG
100 TABLET ORAL 2 TIMES DAILY
Qty: 20 TABLET | Refills: 0 | Status: SHIPPED | OUTPATIENT
Start: 2022-11-07 | End: 2022-11-17

## 2022-11-07 ASSESSMENT — PATIENT HEALTH QUESTIONNAIRE - PHQ9
SUM OF ALL RESPONSES TO PHQ QUESTIONS 1-9: 0
SUM OF ALL RESPONSES TO PHQ9 QUESTIONS 1 & 2: 0
2. FEELING DOWN, DEPRESSED OR HOPELESS: 0
1. LITTLE INTEREST OR PLEASURE IN DOING THINGS: 0

## 2022-11-07 NOTE — PROGRESS NOTES
[address] Note    Date: 11/7/2022                                               Kyrie Roe:     Chief Complaint   Patient presents with    Drainage     C/o nasal drainage that is now draining into his chest that is causing some cough. Sx x10 days       HPI  Post nasal drip and cough and chest congestion starting 10 days ago. No sinus pain. No fever. No SOB. There are no problems to display for this patient. Past Medical History:   Diagnosis Date    Hypertension        Current Outpatient Medications   Medication Sig Dispense Refill    doxycycline hyclate (VIBRA-TABS) 100 MG tablet Take 1 tablet by mouth 2 times daily for 10 days 20 tablet 0    loratadine (CLARITIN) 10 MG tablet Take 1 tablet by mouth daily 30 tablet 1    lisinopril (PRINIVIL;ZESTRIL) 20 MG tablet TAKE 1 TABLET BY MOUTH  DAILY 90 tablet 1    atorvastatin (LIPITOR) 20 MG tablet TAKE 1 TABLET BY MOUTH  DAILY 90 tablet 3    amLODIPine (NORVASC) 10 MG tablet TAKE 1 TABLET BY MOUTH  DAILY 90 tablet 1    sildenafil (VIAGRA) 50 MG tablet Take 1 tablet by mouth daily as needed for Erectile Dysfunction 10 tablet 0    famotidine (PEPCID) 20 MG tablet Take 1 tablet by mouth 2 times daily 60 tablet 3     No current facility-administered medications for this visit. No Known Allergies    Review of Systems  No sore throat. Vitals:  BP (!) 178/80   Pulse 81   Temp 97.9 °F (36.6 °C)   Wt 170 lb (77.1 kg)   SpO2 99%   BMI 26.63 kg/m²     Wt Readings from Last 3 Encounters:   11/07/22 170 lb (77.1 kg)   09/14/22 168 lb 12.8 oz (76.6 kg)   07/01/21 174 lb (78.9 kg)        Physical Exam  General:  Well-appearing, NAD, alert, non-toxic  HEENT:  Normocephalic, atraumatic. Pupils equal and round. CHEST/LUNGS: CTAB, no crackles, no wheeze, no rhonchi.  Symmetric rise  CARDIOVASCULAR: RRR,  no murmur, no rub  EXTREMETIES: Normal movement of all extremities  SKIN:  No rash, no cellulitis, no bruising, no

## 2022-11-08 LAB
A/G RATIO: 1.8 (ref 1.1–2.2)
ALBUMIN SERPL-MCNC: 4.9 G/DL (ref 3.4–5)
ALP BLD-CCNC: 98 U/L (ref 40–129)
ALT SERPL-CCNC: 21 U/L (ref 10–40)
ANION GAP SERPL CALCULATED.3IONS-SCNC: 16 MMOL/L (ref 3–16)
AST SERPL-CCNC: 25 U/L (ref 15–37)
BILIRUB SERPL-MCNC: 0.9 MG/DL (ref 0–1)
BUN BLDV-MCNC: 8 MG/DL (ref 7–20)
CALCIUM SERPL-MCNC: 9.8 MG/DL (ref 8.3–10.6)
CHLORIDE BLD-SCNC: 102 MMOL/L (ref 99–110)
CHOLESTEROL, FASTING: 121 MG/DL (ref 0–199)
CO2: 24 MMOL/L (ref 21–32)
CREAT SERPL-MCNC: 0.8 MG/DL (ref 0.8–1.3)
ESTIMATED AVERAGE GLUCOSE: 91.1 MG/DL
GFR SERPL CREATININE-BSD FRML MDRD: >60 ML/MIN/{1.73_M2}
GLUCOSE BLD-MCNC: 90 MG/DL (ref 70–99)
HBA1C MFR BLD: 4.8 %
HDLC SERPL-MCNC: 65 MG/DL (ref 40–60)
LDL CHOLESTEROL CALCULATED: 45 MG/DL
POTASSIUM SERPL-SCNC: 4 MMOL/L (ref 3.5–5.1)
SODIUM BLD-SCNC: 142 MMOL/L (ref 136–145)
TOTAL PROTEIN: 7.6 G/DL (ref 6.4–8.2)
TRIGLYCERIDE, FASTING: 57 MG/DL (ref 0–150)
VLDLC SERPL CALC-MCNC: 11 MG/DL

## 2023-04-01 DIAGNOSIS — I10 HYPERTENSION, ESSENTIAL: ICD-10-CM

## 2023-04-04 RX ORDER — AMLODIPINE BESYLATE 10 MG/1
10 TABLET ORAL DAILY
Qty: 90 TABLET | Refills: 0 | Status: SHIPPED | OUTPATIENT
Start: 2023-04-04

## 2023-04-04 RX ORDER — LISINOPRIL 20 MG/1
20 TABLET ORAL DAILY
Qty: 90 TABLET | Refills: 0 | Status: SHIPPED | OUTPATIENT
Start: 2023-04-04

## 2023-07-05 DIAGNOSIS — I10 HYPERTENSION, ESSENTIAL: ICD-10-CM

## 2023-07-05 RX ORDER — LISINOPRIL 20 MG/1
20 TABLET ORAL DAILY
Qty: 30 TABLET | Refills: 0 | Status: SHIPPED | OUTPATIENT
Start: 2023-07-05

## 2023-07-05 RX ORDER — AMLODIPINE BESYLATE 10 MG/1
10 TABLET ORAL DAILY
Qty: 30 TABLET | Refills: 0 | Status: SHIPPED | OUTPATIENT
Start: 2023-07-05

## 2023-07-05 NOTE — TELEPHONE ENCOUNTER
Medication:   Requested Prescriptions     Pending Prescriptions Disp Refills    amLODIPine (NORVASC) 10 MG tablet [Pharmacy Med Name: amLODIPine Besylate 10 MG Oral Tablet] 90 tablet 3     Sig: TAKE 1 TABLET BY MOUTH DAILY    lisinopril (PRINIVIL;ZESTRIL) 20 MG tablet [Pharmacy Med Name: Lisinopril 20 MG Oral Tablet] 90 tablet 3     Sig: TAKE 1 TABLET BY MOUTH DAILY         Last appt: 11/7/2022   Next appt: Visit date not found    Lab Results   Component Value Date     11/07/2022    K 4.0 11/07/2022     11/07/2022    CO2 24 11/07/2022    BUN 8 11/07/2022    CREATININE 0.8 11/07/2022    GLUCOSE 90 11/07/2022    CALCIUM 9.8 11/07/2022    PROT 7.6 11/07/2022    LABALBU 4.9 11/07/2022    BILITOT 0.9 11/07/2022    ALKPHOS 98 11/07/2022    AST 25 11/07/2022    ALT 21 11/07/2022    LABGLOM >60 11/07/2022    GFRAA >60 04/15/2021    AGRATIO 1.8 11/07/2022    GLOB 2.6 04/15/2021

## 2023-09-09 DIAGNOSIS — I10 HYPERTENSION, ESSENTIAL: ICD-10-CM

## 2023-09-11 RX ORDER — AMLODIPINE BESYLATE 10 MG/1
10 TABLET ORAL DAILY
Qty: 30 TABLET | Refills: 0 | Status: SHIPPED | OUTPATIENT
Start: 2023-09-11

## 2023-09-11 RX ORDER — LISINOPRIL 20 MG/1
20 TABLET ORAL DAILY
Qty: 30 TABLET | Refills: 0 | Status: SHIPPED | OUTPATIENT
Start: 2023-09-11

## 2023-09-27 ENCOUNTER — TELEPHONE (OUTPATIENT)
Dept: FAMILY MEDICINE CLINIC | Age: 68
End: 2023-09-27

## 2023-09-27 ENCOUNTER — OFFICE VISIT (OUTPATIENT)
Dept: FAMILY MEDICINE CLINIC | Age: 68
End: 2023-09-27
Payer: COMMERCIAL

## 2023-09-27 VITALS
WEIGHT: 167 LBS | HEART RATE: 81 BPM | TEMPERATURE: 97.3 F | RESPIRATION RATE: 16 BRPM | DIASTOLIC BLOOD PRESSURE: 88 MMHG | BODY MASS INDEX: 26.21 KG/M2 | HEIGHT: 67 IN | OXYGEN SATURATION: 94 % | SYSTOLIC BLOOD PRESSURE: 140 MMHG

## 2023-09-27 DIAGNOSIS — I10 HYPERTENSION, ESSENTIAL: ICD-10-CM

## 2023-09-27 DIAGNOSIS — E78.5 HYPERLIPIDEMIA, UNSPECIFIED HYPERLIPIDEMIA TYPE: ICD-10-CM

## 2023-09-27 PROCEDURE — 3079F DIAST BP 80-89 MM HG: CPT | Performed by: FAMILY MEDICINE

## 2023-09-27 PROCEDURE — 99213 OFFICE O/P EST LOW 20 MIN: CPT | Performed by: FAMILY MEDICINE

## 2023-09-27 PROCEDURE — 1123F ACP DISCUSS/DSCN MKR DOCD: CPT | Performed by: FAMILY MEDICINE

## 2023-09-27 PROCEDURE — 3077F SYST BP >= 140 MM HG: CPT | Performed by: FAMILY MEDICINE

## 2023-09-27 RX ORDER — LISINOPRIL 20 MG/1
20 TABLET ORAL DAILY
Qty: 90 TABLET | Refills: 1 | Status: SHIPPED | OUTPATIENT
Start: 2023-09-27

## 2023-09-27 RX ORDER — ATORVASTATIN CALCIUM 20 MG/1
20 TABLET, FILM COATED ORAL DAILY
Qty: 90 TABLET | Refills: 3 | Status: SHIPPED | OUTPATIENT
Start: 2023-09-27

## 2023-09-27 RX ORDER — AMLODIPINE BESYLATE 10 MG/1
10 TABLET ORAL DAILY
Qty: 90 TABLET | Refills: 1 | Status: SHIPPED | OUTPATIENT
Start: 2023-09-27

## 2023-09-27 SDOH — ECONOMIC STABILITY: INCOME INSECURITY: HOW HARD IS IT FOR YOU TO PAY FOR THE VERY BASICS LIKE FOOD, HOUSING, MEDICAL CARE, AND HEATING?: NOT HARD AT ALL

## 2023-09-27 SDOH — ECONOMIC STABILITY: HOUSING INSECURITY
IN THE LAST 12 MONTHS, WAS THERE A TIME WHEN YOU DID NOT HAVE A STEADY PLACE TO SLEEP OR SLEPT IN A SHELTER (INCLUDING NOW)?: NO

## 2023-09-27 SDOH — ECONOMIC STABILITY: FOOD INSECURITY: WITHIN THE PAST 12 MONTHS, THE FOOD YOU BOUGHT JUST DIDN'T LAST AND YOU DIDN'T HAVE MONEY TO GET MORE.: NEVER TRUE

## 2023-09-27 SDOH — ECONOMIC STABILITY: FOOD INSECURITY: WITHIN THE PAST 12 MONTHS, YOU WORRIED THAT YOUR FOOD WOULD RUN OUT BEFORE YOU GOT MONEY TO BUY MORE.: NEVER TRUE

## 2023-09-27 NOTE — TELEPHONE ENCOUNTER
Pt wanted to know if he was going to get blood work done today for his appt, because he needed to know if he needed to fast for his appt today at 530.  Please call patient with update

## 2023-09-27 NOTE — PROGRESS NOTES
essential  The following orders have not been finalized:  -     lisinopril (PRINIVIL;ZESTRIL) 20 MG tablet  -     amLODIPine (NORVASC) 10 MG tablet  2. Hyperlipidemia, unspecified hyperlipidemia type  The following orders have not been finalized:  -     atorvastatin (LIPITOR) 20 MG tablet       No orders of the defined types were placed in this encounter. No follow-ups on file.     Bernarda Mckinney MD, MD    9/27/2023  5:47 PM

## 2023-11-01 ENCOUNTER — OFFICE VISIT (OUTPATIENT)
Dept: FAMILY MEDICINE CLINIC | Age: 68
End: 2023-11-01

## 2023-11-01 VITALS
TEMPERATURE: 97.7 F | WEIGHT: 167 LBS | SYSTOLIC BLOOD PRESSURE: 135 MMHG | RESPIRATION RATE: 16 BRPM | DIASTOLIC BLOOD PRESSURE: 77 MMHG | BODY MASS INDEX: 26.21 KG/M2 | HEIGHT: 67 IN | OXYGEN SATURATION: 94 % | HEART RATE: 78 BPM

## 2023-11-01 DIAGNOSIS — I10 HYPERTENSION, ESSENTIAL: ICD-10-CM

## 2023-11-01 DIAGNOSIS — Z72.0 TOBACCO USE: Primary | ICD-10-CM

## 2023-11-01 DIAGNOSIS — Z87.891 PERSONAL HISTORY OF TOBACCO USE: ICD-10-CM

## 2023-11-01 DIAGNOSIS — Z00.00 ANNUAL PHYSICAL EXAM: ICD-10-CM

## 2023-11-01 DIAGNOSIS — N52.9 ERECTILE DYSFUNCTION, UNSPECIFIED ERECTILE DYSFUNCTION TYPE: ICD-10-CM

## 2023-11-01 DIAGNOSIS — Z23 NEED FOR VACCINATION: ICD-10-CM

## 2023-11-01 DIAGNOSIS — E78.5 HYPERLIPIDEMIA, UNSPECIFIED HYPERLIPIDEMIA TYPE: ICD-10-CM

## 2023-11-01 DIAGNOSIS — I73.9 CLAUDICATION (HCC): ICD-10-CM

## 2023-11-01 RX ORDER — SILDENAFIL 50 MG/1
50 TABLET, FILM COATED ORAL DAILY PRN
Qty: 30 TABLET | Refills: 0 | Status: SHIPPED | OUTPATIENT
Start: 2023-11-01

## 2023-11-01 RX ORDER — AMLODIPINE BESYLATE 10 MG/1
10 TABLET ORAL DAILY
Qty: 90 TABLET | Refills: 1 | Status: SHIPPED | OUTPATIENT
Start: 2023-11-01

## 2023-11-01 RX ORDER — LISINOPRIL 20 MG/1
20 TABLET ORAL DAILY
Qty: 90 TABLET | Refills: 1 | Status: SHIPPED | OUTPATIENT
Start: 2023-11-01

## 2023-11-01 RX ORDER — ATORVASTATIN CALCIUM 20 MG/1
20 TABLET, FILM COATED ORAL DAILY
Qty: 90 TABLET | Refills: 3 | Status: SHIPPED | OUTPATIENT
Start: 2023-11-01

## 2023-11-20 DIAGNOSIS — Z00.00 ANNUAL PHYSICAL EXAM: ICD-10-CM

## 2023-11-20 LAB
ALBUMIN SERPL-MCNC: 4.6 G/DL (ref 3.4–5)
ALBUMIN/GLOB SERPL: 1.8 {RATIO} (ref 1.1–2.2)
ALP SERPL-CCNC: 75 U/L (ref 40–129)
ALT SERPL-CCNC: 23 U/L (ref 10–40)
ANION GAP SERPL CALCULATED.3IONS-SCNC: 10 MMOL/L (ref 3–16)
AST SERPL-CCNC: 35 U/L (ref 15–37)
BASOPHILS # BLD: 0.1 K/UL (ref 0–0.2)
BASOPHILS NFR BLD: 0.6 %
BILIRUB SERPL-MCNC: 0.7 MG/DL (ref 0–1)
BUN SERPL-MCNC: 11 MG/DL (ref 7–20)
CALCIUM SERPL-MCNC: 9.4 MG/DL (ref 8.3–10.6)
CHLORIDE SERPL-SCNC: 104 MMOL/L (ref 99–110)
CHOLEST SERPL-MCNC: 103 MG/DL (ref 0–199)
CO2 SERPL-SCNC: 27 MMOL/L (ref 21–32)
CREAT SERPL-MCNC: 0.7 MG/DL (ref 0.8–1.3)
DEPRECATED RDW RBC AUTO: 13.7 % (ref 12.4–15.4)
EOSINOPHIL # BLD: 0.1 K/UL (ref 0–0.6)
EOSINOPHIL NFR BLD: 0.7 %
GFR SERPLBLD CREATININE-BSD FMLA CKD-EPI: >60 ML/MIN/{1.73_M2}
GLUCOSE SERPL-MCNC: 100 MG/DL (ref 70–99)
HCT VFR BLD AUTO: 43.6 % (ref 40.5–52.5)
HDLC SERPL-MCNC: 58 MG/DL (ref 40–60)
HGB BLD-MCNC: 14.9 G/DL (ref 13.5–17.5)
LDL CHOLESTEROL CALCULATED: 37 MG/DL
LYMPHOCYTES # BLD: 1.9 K/UL (ref 1–5.1)
LYMPHOCYTES NFR BLD: 18.9 %
MCH RBC QN AUTO: 34.2 PG (ref 26–34)
MCHC RBC AUTO-ENTMCNC: 34.2 G/DL (ref 31–36)
MCV RBC AUTO: 100.1 FL (ref 80–100)
MONOCYTES # BLD: 0.6 K/UL (ref 0–1.3)
MONOCYTES NFR BLD: 6 %
NEUTROPHILS # BLD: 7.4 K/UL (ref 1.7–7.7)
NEUTROPHILS NFR BLD: 73.8 %
PLATELET # BLD AUTO: 232 K/UL (ref 135–450)
PMV BLD AUTO: 8.4 FL (ref 5–10.5)
POTASSIUM SERPL-SCNC: 4.5 MMOL/L (ref 3.5–5.1)
PROT SERPL-MCNC: 7.1 G/DL (ref 6.4–8.2)
RBC # BLD AUTO: 4.35 M/UL (ref 4.2–5.9)
SODIUM SERPL-SCNC: 141 MMOL/L (ref 136–145)
TRIGL SERPL-MCNC: 39 MG/DL (ref 0–150)
VLDLC SERPL CALC-MCNC: 8 MG/DL
WBC # BLD AUTO: 10 K/UL (ref 4–11)

## 2023-11-21 LAB
EST. AVERAGE GLUCOSE BLD GHB EST-MCNC: 93.9 MG/DL
HBA1C MFR BLD: 4.9 %

## 2023-11-27 LAB
PSA FREE MFR SERPL: 40 %
PSA FREE SERPL-MCNC: <0.1 UG/L
PSA SERPL-MCNC: 0.1 UG/L (ref 0–4)

## 2023-12-02 ENCOUNTER — HOSPITAL ENCOUNTER (OUTPATIENT)
Dept: CT IMAGING | Age: 68
Discharge: HOME | End: 2023-12-02
Payer: COMMERCIAL

## 2023-12-02 DIAGNOSIS — Z87.891 PERSONAL HISTORY OF TOBACCO USE: ICD-10-CM

## 2023-12-02 PROCEDURE — 71271 CT THORAX LUNG CANCER SCR C-: CPT

## 2023-12-04 ENCOUNTER — TELEPHONE (OUTPATIENT)
Dept: CASE MANAGEMENT | Age: 68
End: 2023-12-04

## 2023-12-04 NOTE — TELEPHONE ENCOUNTER
Baseline lung screen on 12/2/23. LRAD2. Recommended screen in one year. Results letter mailed.  Will follow in the lung screening program.

## 2024-02-29 DIAGNOSIS — I10 HYPERTENSION, ESSENTIAL: ICD-10-CM

## 2024-02-29 RX ORDER — LISINOPRIL 20 MG/1
20 TABLET ORAL DAILY
Qty: 90 TABLET | Refills: 0 | Status: SHIPPED | OUTPATIENT
Start: 2024-02-29

## 2024-02-29 NOTE — TELEPHONE ENCOUNTER
Medication and Quantity requested: lisinopril (PRINIVIL;ZESTRIL) 20 MG tablet      Last Visit  11/1/2023    Pharmacy and phone number updated in EPIC:  yes- [facility] on [address]

## 2024-02-29 NOTE — TELEPHONE ENCOUNTER
Medication:   Requested Prescriptions     Pending Prescriptions Disp Refills    lisinopril (PRINIVIL;ZESTRIL) 20 MG tablet 90 tablet 1     Sig: Take 1 tablet by mouth daily       Last Filled:  11/01/23    Patient Phone Number: [phone] (home) [phone] (work)    Last appt: 11/1/2023   Next appt: Visit date not found    Lab Results   Component Value Date     11/20/2023    K 4.5 11/20/2023     11/20/2023    CO2 27 11/20/2023    BUN 11 11/20/2023    CREATININE 0.7 (L) 11/20/2023    GLUCOSE 100 (H) 11/20/2023    CALCIUM 9.4 11/20/2023    PROT 7.1 11/20/2023    LABALBU 4.6 11/20/2023    BILITOT 0.7 11/20/2023    ALKPHOS 75 11/20/2023    AST 35 11/20/2023    ALT 23 11/20/2023    LABGLOM >60 11/20/2023    GFRAA >60 04/15/2021    AGRATIO 1.8 11/20/2023    GLOB 2.6 04/15/2021

## 2024-03-14 DIAGNOSIS — E78.5 HYPERLIPIDEMIA, UNSPECIFIED HYPERLIPIDEMIA TYPE: ICD-10-CM

## 2024-03-14 DIAGNOSIS — I10 HYPERTENSION, ESSENTIAL: ICD-10-CM

## 2024-03-14 RX ORDER — ATORVASTATIN CALCIUM 20 MG/1
20 TABLET, FILM COATED ORAL DAILY
Qty: 90 TABLET | Refills: 0 | Status: SHIPPED | OUTPATIENT
Start: 2024-03-14

## 2024-03-14 RX ORDER — AMLODIPINE BESYLATE 10 MG/1
10 TABLET ORAL DAILY
Qty: 90 TABLET | Refills: 0 | Status: SHIPPED | OUTPATIENT
Start: 2024-03-14

## 2024-03-14 NOTE — TELEPHONE ENCOUNTER
Medication and Quantity requested:  amLODIPine (NORVASC) 10 MG tablet  - qty 90    atorvastatin (LIPITOR) 20 MG tablet  - qty 90    (2 MEDICATIONS)    Last Visit  11/01/23 - Dr Pederson    Pharmacy and phone number updated in EPIC:   [facility]

## 2024-03-14 NOTE — TELEPHONE ENCOUNTER
Medication:   Requested Prescriptions     Pending Prescriptions Disp Refills    atorvastatin (LIPITOR) 20 MG tablet 90 tablet 3     Sig: Take 1 tablet by mouth daily    amLODIPine (NORVASC) 10 MG tablet 90 tablet 1     Sig: Take 1 tablet by mouth daily       Last Filled:  11/01/23 90 ct 1 refill    Patient Phone Number: [phone] (home) [phone] (work)    Last appt: 11/1/2023   Next appt: Visit date not found    Lab Results   Component Value Date     11/20/2023    K 4.5 11/20/2023     11/20/2023    CO2 27 11/20/2023    BUN 11 11/20/2023    CREATININE 0.7 (L) 11/20/2023    GLUCOSE 100 (H) 11/20/2023    CALCIUM 9.4 11/20/2023    PROT 7.1 11/20/2023    LABALBU 4.6 11/20/2023    BILITOT 0.7 11/20/2023    ALKPHOS 75 11/20/2023    AST 35 11/20/2023    ALT 23 11/20/2023    LABGLOM >60 11/20/2023    GFRAA >60 04/15/2021    AGRATIO 1.8 11/20/2023    GLOB 2.6 04/15/2021

## 2024-03-25 ENCOUNTER — OFFICE VISIT (OUTPATIENT)
Dept: FAMILY MEDICINE CLINIC | Age: 69
End: 2024-03-25
Payer: COMMERCIAL

## 2024-03-25 VITALS
OXYGEN SATURATION: 98 % | WEIGHT: 171.6 LBS | BODY MASS INDEX: 26.88 KG/M2 | DIASTOLIC BLOOD PRESSURE: 84 MMHG | SYSTOLIC BLOOD PRESSURE: 136 MMHG | HEART RATE: 77 BPM

## 2024-03-25 DIAGNOSIS — S59.901A INJURY OF RIGHT ELBOW, INITIAL ENCOUNTER: ICD-10-CM

## 2024-03-25 DIAGNOSIS — M25.421 EFFUSION OF RIGHT ELBOW: Primary | ICD-10-CM

## 2024-03-25 PROCEDURE — 20605 DRAIN/INJ JOINT/BURSA W/O US: CPT | Performed by: NURSE PRACTITIONER

## 2024-03-25 PROCEDURE — 99214 OFFICE O/P EST MOD 30 MIN: CPT | Performed by: NURSE PRACTITIONER

## 2024-03-25 PROCEDURE — 1123F ACP DISCUSS/DSCN MKR DOCD: CPT | Performed by: NURSE PRACTITIONER

## 2024-03-25 RX ORDER — PREDNISONE 50 MG/1
50 TABLET ORAL DAILY
Qty: 5 TABLET | Refills: 0 | Status: SHIPPED | OUTPATIENT
Start: 2024-03-25 | End: 2024-03-30

## 2024-03-25 ASSESSMENT — PATIENT HEALTH QUESTIONNAIRE - PHQ9
1. LITTLE INTEREST OR PLEASURE IN DOING THINGS: NOT AT ALL
2. FEELING DOWN, DEPRESSED OR HOPELESS: NOT AT ALL
SUM OF ALL RESPONSES TO PHQ QUESTIONS 1-9: 0
SUM OF ALL RESPONSES TO PHQ9 QUESTIONS 1 & 2: 0
SUM OF ALL RESPONSES TO PHQ QUESTIONS 1-9: 0

## 2024-03-25 ASSESSMENT — ENCOUNTER SYMPTOMS
COLOR CHANGE: 0
SHORTNESS OF BREATH: 0

## 2024-03-25 NOTE — PROGRESS NOTES
SUBJECTIVE:  Pt is a of 68 y.o. male comes in today with   Chief Complaint   Patient presents with    Joint Swelling     Right elbow swelling. He state he slip and fell on it a week ago      Presenting today for evaluation of right elbow injury. Slipped and feel in garage 3 weeks ago. Landed on right elbow. Pain initially, that has since resolved. 1 week ago noticed localized swelling. Denies pain. Denies numbness or tingling in arm.       Prior to Visit Medications    Medication Sig Taking? Authorizing Provider   atorvastatin (LIPITOR) 20 MG tablet Take 1 tablet by mouth daily Yes Dex Madrigal MD   amLODIPine (NORVASC) 10 MG tablet Take 1 tablet by mouth daily Yes Dex Madrigal MD   lisinopril (PRINIVIL;ZESTRIL) 20 MG tablet Take 1 tablet by mouth daily Yes Dex Madrigal MD   sildenafil (VIAGRA) 50 MG tablet Take 1 tablet by mouth daily as needed for Erectile Dysfunction Yes Rakesh Pederson MD         Patient's allergies, past medical, surgical, social and family histories werereviewed and updated as appropriate.      Review of Systems   Respiratory:  Negative for shortness of breath.    Cardiovascular:  Negative for chest pain.   Musculoskeletal:  Positive for joint swelling (Rt Elbow). Negative for arthralgias.   Skin:  Negative for color change.   Neurological:  Negative for numbness.         Physical Exam  Vitals reviewed.   Constitutional:       Appearance: He is well-developed.   HENT:      Head: Normocephalic and atraumatic.   Musculoskeletal:      Right elbow: Effusion present. No tenderness.      Comments: Procedure note: informed verbal consent obtained. Right elbow cleansed with betadine. 18 ga needle to drain 18 cc of sanguinous fluid. No bleeding. Band aid and ace wrap applied. Pt tolerated well.    Skin:     General: Skin is warm and dry.   Neurological:      Mental Status: He is alert and oriented to person, place, and time.   Psychiatric:         Mood and Affect: Mood normal.         Behavior:

## 2024-05-13 ENCOUNTER — OFFICE VISIT (OUTPATIENT)
Dept: FAMILY MEDICINE CLINIC | Age: 69
End: 2024-05-13

## 2024-05-13 VITALS
HEART RATE: 71 BPM | TEMPERATURE: 98.1 F | RESPIRATION RATE: 16 BRPM | HEIGHT: 67 IN | WEIGHT: 174 LBS | DIASTOLIC BLOOD PRESSURE: 84 MMHG | OXYGEN SATURATION: 96 % | SYSTOLIC BLOOD PRESSURE: 138 MMHG | BODY MASS INDEX: 27.31 KG/M2

## 2024-05-13 DIAGNOSIS — E78.5 HYPERLIPIDEMIA, UNSPECIFIED HYPERLIPIDEMIA TYPE: ICD-10-CM

## 2024-05-13 DIAGNOSIS — E55.9 VITAMIN D DEFICIENCY: Primary | ICD-10-CM

## 2024-05-13 DIAGNOSIS — I10 HYPERTENSION, ESSENTIAL: ICD-10-CM

## 2024-05-13 DIAGNOSIS — H61.22 IMPACTED CERUMEN, LEFT EAR: ICD-10-CM

## 2024-05-13 RX ORDER — ATORVASTATIN CALCIUM 20 MG/1
20 TABLET, FILM COATED ORAL DAILY
Qty: 90 TABLET | Refills: 3 | Status: SHIPPED | OUTPATIENT
Start: 2024-05-13

## 2024-05-13 RX ORDER — AMLODIPINE BESYLATE 10 MG/1
10 TABLET ORAL DAILY
Qty: 90 TABLET | Refills: 1 | Status: SHIPPED | OUTPATIENT
Start: 2024-05-13

## 2024-05-13 RX ORDER — LISINOPRIL 20 MG/1
20 TABLET ORAL DAILY
Qty: 90 TABLET | Refills: 1 | Status: SHIPPED | OUTPATIENT
Start: 2024-05-13

## 2024-05-13 ASSESSMENT — PATIENT HEALTH QUESTIONNAIRE - PHQ9
1. LITTLE INTEREST OR PLEASURE IN DOING THINGS: NOT AT ALL
SUM OF ALL RESPONSES TO PHQ QUESTIONS 1-9: 0
SUM OF ALL RESPONSES TO PHQ QUESTIONS 1-9: 0
SUM OF ALL RESPONSES TO PHQ9 QUESTIONS 1 & 2: 0
2. FEELING DOWN, DEPRESSED OR HOPELESS: NOT AT ALL
SUM OF ALL RESPONSES TO PHQ QUESTIONS 1-9: 0
SUM OF ALL RESPONSES TO PHQ QUESTIONS 1-9: 0

## 2024-05-13 NOTE — PROGRESS NOTES
[facility] Medicine  Clinic Note    Date: 5/13/2024                                               /Objective:     Chief Complaint   Patient presents with    Hearing Problem     Left year, x2-3 days       HPI  Pt is here for f/u on HTN. Currently takes Amlodipine and Lisinopril. Denies CP or SOB.    Pt reports muffled hearing in the L ear starting 2-3 days ago. Came on gradually. Not painful. No cough. +mild nasal congestion and sneezing.         Patient Active Problem List    Diagnosis Date Noted    Claudication (HCC) 11/01/2023    Tobacco use 11/01/2023       Past Medical History:   Diagnosis Date    Hypertension        Current Outpatient Medications   Medication Sig Dispense Refill    lisinopril (PRINIVIL;ZESTRIL) 20 MG tablet Take 1 tablet by mouth daily 90 tablet 1    amLODIPine (NORVASC) 10 MG tablet Take 1 tablet by mouth daily 90 tablet 1    atorvastatin (LIPITOR) 20 MG tablet Take 1 tablet by mouth daily 90 tablet 3    vitamin D (CHOLECALCIFEROL) 25 MCG (1000 UT) TABS tablet Take 1 tablet by mouth daily 90 tablet 1    sildenafil (VIAGRA) 50 MG tablet Take 1 tablet by mouth daily as needed for Erectile Dysfunction 30 tablet 0     No current facility-administered medications for this visit.       No Known Allergies    Review of Systems  No fever, no vomiting    Vitals:  /84   Pulse 71   Temp 98.1 °F (36.7 °C) (Temporal)   Resp 16   Ht 1.702 m (5' 7\")   Wt 78.9 kg (174 lb)   SpO2 96%   BMI 27.25 kg/m²     Wt Readings from Last 3 Encounters:   05/13/24 78.9 kg (174 lb)   03/25/24 77.8 kg (171 lb 9.6 oz)   11/01/23 75.8 kg (167 lb)        Physical Exam  General:  Well-appearing, NAD, alert, non-toxic  HEENT:  Normocephalic, atraumatic. Pupils equal and round.  Right TM normal.  + Left TM obscured by moist cerumen.  CHEST/LUNGS: CTAB, no crackles, no wheeze, no rhonchi. Symmetric rise  CARDIOVASCULAR: RRR,  no murmur, no rub  EXTREMETIES: Normal movement of all extremities  SKIN:  No rash, no

## 2024-08-09 DIAGNOSIS — E55.9 VITAMIN D DEFICIENCY: ICD-10-CM

## 2024-08-09 DIAGNOSIS — I10 HYPERTENSION, ESSENTIAL: ICD-10-CM

## 2024-08-09 RX ORDER — AMLODIPINE BESYLATE 10 MG/1
10 TABLET ORAL DAILY
Qty: 90 TABLET | Refills: 1 | Status: SHIPPED | OUTPATIENT
Start: 2024-08-09

## 2024-08-09 RX ORDER — CHOLECALCIFEROL (VITAMIN D3) 25 MCG
1 TABLET ORAL DAILY
Qty: 90 TABLET | Refills: 1 | Status: SHIPPED | OUTPATIENT
Start: 2024-08-09

## 2024-08-09 NOTE — TELEPHONE ENCOUNTER
Medication:   Requested Prescriptions     Pending Prescriptions Disp Refills    amLODIPine (NORVASC) 10 MG tablet [Pharmacy Med Name: AMLODIPINE BESYLATE 10 MG TAB] 90 tablet 1     Sig: TAKE 1 TABLET BY MOUTH EVERY DAY    vitamin D3 (CHOLECALCIFEROL) 25 MCG (1000 UT) TABS tablet [Pharmacy Med Name: VITAMIN D3 1,000 UNIT TABLET] 90 tablet 1     Sig: TAKE 1 TABLET BY MOUTH EVERY DAY        Last Filled:  05/13/2024, 90,1  05/13/2024, 90, 1    Patient Phone Number: [phone] (home) [phone] (work)    Last appt: 5/13/2024   Next appt: Visit date not found    Last OARRS:        No data to display

## 2024-08-10 DIAGNOSIS — I10 HYPERTENSION, ESSENTIAL: ICD-10-CM

## 2024-08-12 RX ORDER — LISINOPRIL 20 MG/1
20 TABLET ORAL DAILY
Qty: 90 TABLET | Refills: 1 | Status: SHIPPED | OUTPATIENT
Start: 2024-08-12

## 2024-08-12 NOTE — TELEPHONE ENCOUNTER
Medication:   Requested Prescriptions     Pending Prescriptions Disp Refills    lisinopril (PRINIVIL;ZESTRIL) 20 MG tablet [Pharmacy Med Name: LISINOPRIL 20 MG TABLET] 90 tablet 1     Sig: TAKE 1 TABLET BY MOUTH EVERY DAY       Last Filled:  05/13/2024    Patient Phone Number: [phone] (home) [phone] (work)    Last appt: 5/13/2024   Next appt: Visit date not found    Lab Results   Component Value Date     11/20/2023    K 4.5 11/20/2023     11/20/2023    CO2 27 11/20/2023    BUN 11 11/20/2023    CREATININE 0.7 (L) 11/20/2023    GLUCOSE 100 (H) 11/20/2023    CALCIUM 9.4 11/20/2023    BILITOT 0.7 11/20/2023    ALKPHOS 75 11/20/2023    AST 35 11/20/2023    ALT 23 11/20/2023    LABGLOM >60 11/20/2023    GFRAA >60 04/15/2021    AGRATIO 1.8 11/20/2023    GLOB 2.6 04/15/2021

## 2024-11-04 ENCOUNTER — TELEPHONE (OUTPATIENT)
Dept: CASE MANAGEMENT | Age: 69
End: 2024-11-04

## 2024-11-04 DIAGNOSIS — Z72.0 TOBACCO USE: Primary | ICD-10-CM

## 2024-11-04 NOTE — TELEPHONE ENCOUNTER
Rakesh Kincaid Dr., MD,    This is a friendly reminder that your patient is due for their annual lung screen on 12/2/24.  For your convenience, I have pended the order for the scan.  If you do not agree with the need for the test, please cancel the order and let me know.      Patient will be mailed reminder letter to schedule.      Thank you,     Beata Stone  Lung Navigator  [facility]  [phone]    No future appointments.    Last PCP Appt: 5/13/24     Lung Screen Criteria  Age 50-80  Current smoker or quit within the last 15 years  Has => 20 pack year history.

## 2025-01-02 ENCOUNTER — CLINICAL DOCUMENTATION (OUTPATIENT)
Dept: CASE MANAGEMENT | Age: 70
End: 2025-01-02

## 2025-01-02 NOTE — PROGRESS NOTES
Patient meets lung screening criteria. Patient due for annual CT Lung Screening. Second reminder letter mailed. If ordered, Patient may call [phone] to schedule.     Lung Screen Criteria  Age 50-80  Current smoker or quit within the last 15 years  Has => 20 pack year history.    No future appointments.    Active Lung Screen order on chart.

## 2025-02-03 ENCOUNTER — CLINICAL DOCUMENTATION (OUTPATIENT)
Dept: CASE MANAGEMENT | Age: 70
End: 2025-02-03

## 2025-02-03 NOTE — PROGRESS NOTES
Patient meets lung screening criteria. Patient due for annual CT Lung Screening. Final reminder letter mailed. If ordered, Patient may call [phone] to schedule.     Lung Screen Criteria  Age 50-80  Current smoker or quit within the last 15 years  Has => 20 pack year history.    No future appointments.    Active Lung Screen order on chart: Yes

## 2025-02-19 DIAGNOSIS — E55.9 VITAMIN D DEFICIENCY: ICD-10-CM

## 2025-02-19 RX ORDER — CHOLECALCIFEROL (VITAMIN D3) 25 MCG
1 TABLET ORAL DAILY
Qty: 90 TABLET | Refills: 1 | Status: SHIPPED | OUTPATIENT
Start: 2025-02-19

## 2025-02-19 NOTE — TELEPHONE ENCOUNTER
Medication:   Requested Prescriptions     Pending Prescriptions Disp Refills    vitamin D3 (CHOLECALCIFEROL) 25 MCG (1000 UT) TABS tablet [Pharmacy Med Name: VITAMIN D3 25 MCG TABLET] 90 tablet 1     Sig: TAKE 1 TABLET BY MOUTH EVERY DAY       Last Filled:  08/09/2024    Patient Phone Number: [phone] (home) [phone] (work)    Last appt: 5/13/2024   Next appt: Visit date not found    Last Labs DM: No results found for: \"VITD25\"

## 2025-04-12 DIAGNOSIS — I10 HYPERTENSION, ESSENTIAL: ICD-10-CM

## 2025-04-14 DIAGNOSIS — E78.5 HYPERLIPIDEMIA, UNSPECIFIED HYPERLIPIDEMIA TYPE: ICD-10-CM

## 2025-04-14 RX ORDER — ATORVASTATIN CALCIUM 20 MG/1
20 TABLET, FILM COATED ORAL DAILY
Qty: 90 TABLET | Refills: 3 | Status: SHIPPED | OUTPATIENT
Start: 2025-04-14

## 2025-04-14 RX ORDER — AMLODIPINE BESYLATE 10 MG/1
10 TABLET ORAL DAILY
Qty: 30 TABLET | Refills: 0 | Status: SHIPPED | OUTPATIENT
Start: 2025-04-14

## 2025-04-14 NOTE — TELEPHONE ENCOUNTER
Medication:   Requested Prescriptions     Pending Prescriptions Disp Refills    atorvastatin (LIPITOR) 20 MG tablet [Pharmacy Med Name: ATORVASTATIN 20 MG TABLET] 90 tablet 3     Sig: TAKE 1 TABLET BY MOUTH EVERY DAY       Last Filled:  05/13/2024    Patient Phone Number: [phone] (home) [phone] (work)    Last appt: 5/13/2024   Next appt: Visit date not found    Last Lipid:   Lab Results   Component Value Date/Time    CHOL 163 03/04/2020 04:43 PM    TRIG 81 03/04/2020 04:43 PM    HDL 58 11/20/2023 12:07 PM

## 2025-04-14 NOTE — TELEPHONE ENCOUNTER
Medication:   Requested Prescriptions     Pending Prescriptions Disp Refills    amLODIPine (NORVASC) 10 MG tablet [Pharmacy Med Name: AMLODIPINE BESYLATE 10 MG TAB] 90 tablet 1     Sig: TAKE 1 TABLET BY MOUTH EVERY DAY       Last Filled:  08/09/2024    Patient Phone Number: [phone] (home) [phone] (work)    Last appt: 5/13/2024   Next appt: 4/14/2025    Lab Results   Component Value Date     11/20/2023    K 4.5 11/20/2023     11/20/2023    CO2 27 11/20/2023    BUN 11 11/20/2023    CREATININE 0.7 (L) 11/20/2023    GLUCOSE 100 (H) 11/20/2023    CALCIUM 9.4 11/20/2023    BILITOT 0.7 11/20/2023    ALKPHOS 75 11/20/2023    AST 35 11/20/2023    ALT 23 11/20/2023    LABGLOM >60 11/20/2023    GFRAA >60 04/15/2021    AGRATIO 1.8 11/20/2023    GLOB 2.6 04/15/2021

## 2025-04-21 ENCOUNTER — OFFICE VISIT (OUTPATIENT)
Dept: FAMILY MEDICINE CLINIC | Age: 70
End: 2025-04-21
Payer: MEDICARE

## 2025-04-21 VITALS
BODY MASS INDEX: 27 KG/M2 | OXYGEN SATURATION: 98 % | HEART RATE: 89 BPM | WEIGHT: 172 LBS | HEIGHT: 67 IN | SYSTOLIC BLOOD PRESSURE: 152 MMHG | DIASTOLIC BLOOD PRESSURE: 84 MMHG | TEMPERATURE: 98.1 F

## 2025-04-21 DIAGNOSIS — I10 HYPERTENSION, ESSENTIAL: ICD-10-CM

## 2025-04-21 DIAGNOSIS — Z00.00 ANNUAL PHYSICAL EXAM: Primary | ICD-10-CM

## 2025-04-21 PROCEDURE — G2211 COMPLEX E/M VISIT ADD ON: HCPCS | Performed by: FAMILY MEDICINE

## 2025-04-21 PROCEDURE — 3079F DIAST BP 80-89 MM HG: CPT | Performed by: FAMILY MEDICINE

## 2025-04-21 PROCEDURE — 99213 OFFICE O/P EST LOW 20 MIN: CPT | Performed by: FAMILY MEDICINE

## 2025-04-21 PROCEDURE — 1123F ACP DISCUSS/DSCN MKR DOCD: CPT | Performed by: FAMILY MEDICINE

## 2025-04-21 PROCEDURE — 3077F SYST BP >= 140 MM HG: CPT | Performed by: FAMILY MEDICINE

## 2025-04-21 SDOH — ECONOMIC STABILITY: FOOD INSECURITY: WITHIN THE PAST 12 MONTHS, YOU WORRIED THAT YOUR FOOD WOULD RUN OUT BEFORE YOU GOT MONEY TO BUY MORE.: NEVER TRUE

## 2025-04-21 SDOH — ECONOMIC STABILITY: FOOD INSECURITY: WITHIN THE PAST 12 MONTHS, THE FOOD YOU BOUGHT JUST DIDN'T LAST AND YOU DIDN'T HAVE MONEY TO GET MORE.: NEVER TRUE

## 2025-04-21 ASSESSMENT — PATIENT HEALTH QUESTIONNAIRE - PHQ9
SUM OF ALL RESPONSES TO PHQ QUESTIONS 1-9: 0
1. LITTLE INTEREST OR PLEASURE IN DOING THINGS: NOT AT ALL
2. FEELING DOWN, DEPRESSED OR HOPELESS: NOT AT ALL
SUM OF ALL RESPONSES TO PHQ QUESTIONS 1-9: 0

## 2025-04-21 NOTE — PROGRESS NOTES
[facility] Medicine  Clinic Note    Date: 4/21/2025                                               /Objective:     Chief Complaint   Patient presents with    Medication Refill    Blood Work       HPI  Patient is here for follow-up on hypertension.  Currently takes amlodipine and lisinopril.  Denies chest pain or shortness of breath. Says he is taking medicines consistently.         Patient Active Problem List    Diagnosis Date Noted    Claudication 11/01/2023    Tobacco use 11/01/2023       Past Medical History:   Diagnosis Date    Hypertension        Current Outpatient Medications   Medication Sig Dispense Refill    amLODIPine (NORVASC) 10 MG tablet TAKE 1 TABLET BY MOUTH EVERY DAY 30 tablet 0    atorvastatin (LIPITOR) 20 MG tablet TAKE 1 TABLET BY MOUTH EVERY DAY 90 tablet 3    vitamin D3 (CHOLECALCIFEROL) 25 MCG (1000 UT) TABS tablet TAKE 1 TABLET BY MOUTH EVERY DAY 90 tablet 1    lisinopril (PRINIVIL;ZESTRIL) 20 MG tablet TAKE 1 TABLET BY MOUTH EVERY DAY 90 tablet 1    sildenafil (VIAGRA) 50 MG tablet Take 1 tablet by mouth daily as needed for Erectile Dysfunction 30 tablet 0     No current facility-administered medications for this visit.       No Known Allergies    Review of Systems  No fever, no cough    Vitals:  BP (!) 152/84   Pulse 89   Temp 98.1 °F (36.7 °C)   Ht 1.702 m (5' 7\")   Wt 78 kg (172 lb)   SpO2 98%   BMI 26.94 kg/m²     Wt Readings from Last 3 Encounters:   04/21/25 78 kg (172 lb)   05/13/24 78.9 kg (174 lb)   03/25/24 77.8 kg (171 lb 9.6 oz)        Physical Exam  General:  Well-appearing, NAD, alert, non-toxic  HEENT:  Normocephalic, atraumatic. Pupils equal and round.   CHEST/LUNGS: CTAB, no crackles, no wheeze, no rhonchi. Symmetric rise  CARDIOVASCULAR: RRR,  no murmur, no rub  EXTREMETIES: Normal movement of all extremities  SKIN:  No rash, no cellulitis, no bruising, no petechiae/purpura/vesicles/pustules/abscess  PSYCH:  A+O x 3; normal affect  NEURO:  GCS 15, CN2-12 grossly

## 2025-04-23 DIAGNOSIS — I10 HYPERTENSION, ESSENTIAL: ICD-10-CM

## 2025-04-23 RX ORDER — LISINOPRIL 20 MG/1
20 TABLET ORAL DAILY
Qty: 90 TABLET | Refills: 1 | Status: SHIPPED | OUTPATIENT
Start: 2025-04-23

## 2025-04-23 NOTE — TELEPHONE ENCOUNTER
Medication:   Requested Prescriptions     Pending Prescriptions Disp Refills    lisinopril (PRINIVIL;ZESTRIL) 20 MG tablet [Pharmacy Med Name: LISINOPRIL 20 MG TABLET] 90 tablet 1     Sig: TAKE 1 TABLET BY MOUTH EVERY DAY       Last Filled:      Patient Phone Number: [phone] (home) [phone] (work)    Last appt: 4/21/2025   Next appt: 5/12/2025    Lab Results   Component Value Date     11/20/2023    K 4.5 11/20/2023     11/20/2023    CO2 27 11/20/2023    BUN 11 11/20/2023    CREATININE 0.7 (L) 11/20/2023    GLUCOSE 100 (H) 11/20/2023    CALCIUM 9.4 11/20/2023    BILITOT 0.7 11/20/2023    ALKPHOS 75 11/20/2023    AST 35 11/20/2023    ALT 23 11/20/2023    LABGLOM >60 11/20/2023    GFRAA >60 04/15/2021    AGRATIO 1.8 11/20/2023    GLOB 2.6 04/15/2021

## 2025-04-29 ENCOUNTER — TELEPHONE (OUTPATIENT)
Dept: FAMILY MEDICINE CLINIC | Age: 70
End: 2025-04-29

## 2025-04-29 NOTE — TELEPHONE ENCOUNTER
LVMTCB- Need to call and schedule Medicare awv.   we can switch his 5/12 appointment to AWV if he is okay with that

## 2025-05-06 DIAGNOSIS — Z00.00 ANNUAL PHYSICAL EXAM: ICD-10-CM

## 2025-05-07 ENCOUNTER — RESULTS FOLLOW-UP (OUTPATIENT)
Dept: FAMILY MEDICINE CLINIC | Age: 70
End: 2025-05-07

## 2025-05-07 LAB
ALBUMIN SERPL-MCNC: 4.5 G/DL (ref 3.4–5)
ALBUMIN/GLOB SERPL: 1.6 {RATIO} (ref 1.1–2.2)
ALP SERPL-CCNC: 75 U/L (ref 40–129)
ALT SERPL-CCNC: 22 U/L (ref 10–40)
ANION GAP SERPL CALCULATED.3IONS-SCNC: 10 MMOL/L (ref 3–16)
AST SERPL-CCNC: 28 U/L (ref 15–37)
BASOPHILS # BLD: 0.1 K/UL (ref 0–0.2)
BASOPHILS NFR BLD: 1.1 %
BILIRUB SERPL-MCNC: 0.5 MG/DL (ref 0–1)
BUN SERPL-MCNC: 11 MG/DL (ref 7–20)
CALCIUM SERPL-MCNC: 10.1 MG/DL (ref 8.3–10.6)
CHLORIDE SERPL-SCNC: 102 MMOL/L (ref 99–110)
CHOLEST SERPL-MCNC: 108 MG/DL (ref 0–199)
CO2 SERPL-SCNC: 25 MMOL/L (ref 21–32)
CREAT SERPL-MCNC: 0.8 MG/DL (ref 0.8–1.3)
DEPRECATED RDW RBC AUTO: 13.8 % (ref 12.4–15.4)
EOSINOPHIL # BLD: 0.1 K/UL (ref 0–0.6)
EOSINOPHIL NFR BLD: 1.7 %
EST. AVERAGE GLUCOSE BLD GHB EST-MCNC: 88.2 MG/DL
GFR SERPLBLD CREATININE-BSD FMLA CKD-EPI: >90 ML/MIN/{1.73_M2}
GLUCOSE SERPL-MCNC: 101 MG/DL (ref 70–99)
HBA1C MFR BLD: 4.7 %
HCT VFR BLD AUTO: 44.1 % (ref 40.5–52.5)
HDLC SERPL-MCNC: 46 MG/DL (ref 40–60)
HGB BLD-MCNC: 15 G/DL (ref 13.5–17.5)
LDL CHOLESTEROL: 42 MG/DL
LYMPHOCYTES # BLD: 1.9 K/UL (ref 1–5.1)
LYMPHOCYTES NFR BLD: 22.4 %
MCH RBC QN AUTO: 33.2 PG (ref 26–34)
MCHC RBC AUTO-ENTMCNC: 34.1 G/DL (ref 31–36)
MCV RBC AUTO: 97.3 FL (ref 80–100)
MONOCYTES # BLD: 0.5 K/UL (ref 0–1.3)
MONOCYTES NFR BLD: 5.6 %
NEUTROPHILS # BLD: 5.8 K/UL (ref 1.7–7.7)
NEUTROPHILS NFR BLD: 69.2 %
PLATELET # BLD AUTO: 349 K/UL (ref 135–450)
PMV BLD AUTO: 8 FL (ref 5–10.5)
POTASSIUM SERPL-SCNC: 4.2 MMOL/L (ref 3.5–5.1)
PROT SERPL-MCNC: 7.3 G/DL (ref 6.4–8.2)
RBC # BLD AUTO: 4.53 M/UL (ref 4.2–5.9)
SODIUM SERPL-SCNC: 137 MMOL/L (ref 136–145)
TRIGL SERPL-MCNC: 102 MG/DL (ref 0–150)
VLDLC SERPL CALC-MCNC: 20 MG/DL
WBC # BLD AUTO: 8.4 K/UL (ref 4–11)

## 2025-05-08 DIAGNOSIS — I10 HYPERTENSION, ESSENTIAL: ICD-10-CM

## 2025-05-08 LAB
PROSTATE SPECIFIC ANTIGEN: 0.27 NG/ML (ref 0–4)
PSA FREE MFR SERPL: 37 %
PSA FREE SERPL-MCNC: 0.1 UG/L

## 2025-05-08 RX ORDER — AMLODIPINE BESYLATE 10 MG/1
10 TABLET ORAL DAILY
Qty: 90 TABLET | Refills: 1 | Status: SHIPPED | OUTPATIENT
Start: 2025-05-08

## 2025-05-08 NOTE — TELEPHONE ENCOUNTER
Medication:   Requested Prescriptions     Pending Prescriptions Disp Refills    amLODIPine (NORVASC) 10 MG tablet [Pharmacy Med Name: AMLODIPINE BESYLATE 10 MG TAB] 90 tablet 1     Sig: TAKE 1 TABLET BY MOUTH EVERY DAY       Last Filled:  4/14/25    Patient Phone Number: [phone] (home) [phone] (work)    Last appt: 4/21/2025   Next appt: 5/12/2025    Lab Results   Component Value Date     05/06/2025    K 4.2 05/06/2025     05/06/2025    CO2 25 05/06/2025    BUN 11 05/06/2025    CREATININE 0.8 05/06/2025    GLUCOSE 101 (H) 05/06/2025    CALCIUM 10.1 05/06/2025    BILITOT 0.5 05/06/2025    ALKPHOS 75 05/06/2025    AST 28 05/06/2025    ALT 22 05/06/2025    LABGLOM >90 05/06/2025    GFRAA >60 04/15/2021    AGRATIO 1.6 05/06/2025    GLOB 2.6 04/15/2021

## 2025-05-12 ENCOUNTER — OFFICE VISIT (OUTPATIENT)
Dept: FAMILY MEDICINE CLINIC | Age: 70
End: 2025-05-12
Payer: MEDICARE

## 2025-05-12 VITALS
OXYGEN SATURATION: 97 % | HEART RATE: 83 BPM | BODY MASS INDEX: 27.15 KG/M2 | HEIGHT: 67 IN | WEIGHT: 173 LBS | TEMPERATURE: 97.8 F | DIASTOLIC BLOOD PRESSURE: 70 MMHG | SYSTOLIC BLOOD PRESSURE: 142 MMHG

## 2025-05-12 DIAGNOSIS — E78.5 HYPERLIPIDEMIA, UNSPECIFIED HYPERLIPIDEMIA TYPE: ICD-10-CM

## 2025-05-12 DIAGNOSIS — Z87.891 PERSONAL HISTORY OF TOBACCO USE: ICD-10-CM

## 2025-05-12 DIAGNOSIS — I10 HYPERTENSION, ESSENTIAL: ICD-10-CM

## 2025-05-12 DIAGNOSIS — Z13.6 SCREENING FOR HEART DISEASE: Primary | ICD-10-CM

## 2025-05-12 DIAGNOSIS — Z00.00 WELCOME TO MEDICARE PREVENTIVE VISIT: ICD-10-CM

## 2025-05-12 PROCEDURE — 99214 OFFICE O/P EST MOD 30 MIN: CPT | Performed by: FAMILY MEDICINE

## 2025-05-12 PROCEDURE — 3078F DIAST BP <80 MM HG: CPT | Performed by: FAMILY MEDICINE

## 2025-05-12 PROCEDURE — G0403 EKG FOR INITIAL PREVENT EXAM: HCPCS | Performed by: FAMILY MEDICINE

## 2025-05-12 PROCEDURE — 1160F RVW MEDS BY RX/DR IN RCRD: CPT | Performed by: FAMILY MEDICINE

## 2025-05-12 PROCEDURE — 3077F SYST BP >= 140 MM HG: CPT | Performed by: FAMILY MEDICINE

## 2025-05-12 PROCEDURE — G0402 INITIAL PREVENTIVE EXAM: HCPCS | Performed by: FAMILY MEDICINE

## 2025-05-12 PROCEDURE — 1159F MED LIST DOCD IN RCRD: CPT | Performed by: FAMILY MEDICINE

## 2025-05-12 PROCEDURE — G0296 VISIT TO DETERM LDCT ELIG: HCPCS | Performed by: FAMILY MEDICINE

## 2025-05-12 PROCEDURE — 1123F ACP DISCUSS/DSCN MKR DOCD: CPT | Performed by: FAMILY MEDICINE

## 2025-05-12 RX ORDER — HYDROCHLOROTHIAZIDE 25 MG/1
25 TABLET ORAL EVERY MORNING
Qty: 90 TABLET | Refills: 1 | Status: SHIPPED | OUTPATIENT
Start: 2025-05-12

## 2025-05-12 ASSESSMENT — LIFESTYLE VARIABLES
HOW OFTEN DURING THE LAST YEAR HAVE YOU NEEDED AN ALCOHOLIC DRINK FIRST THING IN THE MORNING TO GET YOURSELF GOING AFTER A NIGHT OF HEAVY DRINKING: NEVER
HOW OFTEN DURING THE LAST YEAR HAVE YOU HAD A FEELING OF GUILT OR REMORSE AFTER DRINKING: NEVER
HAS A RELATIVE, FRIEND, DOCTOR, OR ANOTHER HEALTH PROFESSIONAL EXPRESSED CONCERN ABOUT YOUR DRINKING OR SUGGESTED YOU CUT DOWN: NO
HOW OFTEN DURING THE LAST YEAR HAVE YOU BEEN UNABLE TO REMEMBER WHAT HAPPENED THE NIGHT BEFORE BECAUSE YOU HAD BEEN DRINKING: NEVER
HOW OFTEN DO YOU HAVE A DRINK CONTAINING ALCOHOL: 2-3 TIMES A WEEK
HAVE YOU OR SOMEONE ELSE BEEN INJURED AS A RESULT OF YOUR DRINKING: NO
HOW OFTEN DURING THE LAST YEAR HAVE YOU FAILED TO DO WHAT WAS NORMALLY EXPECTED FROM YOU BECAUSE OF DRINKING: NEVER
HOW OFTEN DURING THE LAST YEAR HAVE YOU FOUND THAT YOU WERE NOT ABLE TO STOP DRINKING ONCE YOU HAD STARTED: NEVER
HOW MANY STANDARD DRINKS CONTAINING ALCOHOL DO YOU HAVE ON A TYPICAL DAY: 1 OR 2

## 2025-05-12 ASSESSMENT — PATIENT HEALTH QUESTIONNAIRE - PHQ9
2. FEELING DOWN, DEPRESSED OR HOPELESS: NOT AT ALL
SUM OF ALL RESPONSES TO PHQ QUESTIONS 1-9: 0
1. LITTLE INTEREST OR PLEASURE IN DOING THINGS: NOT AT ALL
SUM OF ALL RESPONSES TO PHQ QUESTIONS 1-9: 0

## 2025-05-12 ASSESSMENT — VISUAL ACUITY
OD_CC: 20/20
OS_CC: 20/20

## 2025-05-12 NOTE — PROGRESS NOTES
Medicare Annual Wellness Visit    Oliver Gonzalez is here for Medicare AWV, Follow-up, Hypertension, and Discuss Labs    Assessment & Plan   Screening for heart disease  -     EKG 12 Lead  Personal history of tobacco use  -     [place] VISIT TO DISCUSS LUNG CA SCREEN W LDCT  -     CT Lung Screen (Initial/Annual/Baseline); Future  Welcome to Medicare preventive visit       No follow-ups on file.     Subjective   The following acute and/or chronic problems were also addressed today:  Patient is due for AAA screening, ordered today.  Will also order a lung cancer screening, as he is a smoker.    Patient has history of hypertension.  Currently takes amlodipine and lisinopril.  Blood pressure is above goal.  Will add HCTZ to his regimen.  Follow-up in 1 month for blood pressure check.    Patient has history of hyperlipidemia.  Takes Lipitor.  Lipids are at goal.  Continue current medicines    Patient's complete Health Risk Assessment and screening values have been reviewed and are found in Flowsheets. The following problems were reviewed today and where indicated follow up appointments were made and/or referrals ordered.    Positive Risk Factor Screenings with Interventions:       Alcohol Screening:  AUDIT-C Score: 5  AUDIT Total Score: 5  Total Score Interpretation: 0-7 suggests low risk alcohol consumption but assess individual risks   Interventions:  Patient declined any further intervention or treatment         General HRA Questions:  Select all that apply: (!) New or Increased Pain  Interventions - Pain:  Pt has minor pain in his right calf after twisting it a week ago. Is gradually improving. Does not hurt at rest. Likely muscle strain. Recommend stretches as neede.       Poor Eating Habits/Diet:  Do you eat balanced/healthy meals regularly?: (!) No  Interventions:  Patient declines any further evaluation or treatment       Vision Screen:  Do you have difficulty driving, watching TV, or doing any of your daily

## 2025-05-12 NOTE — PATIENT INSTRUCTIONS
9 Ways to Cut Back on Drinking  Maybe you've found yourself drinking more alcohol than you'd prefer. If you want to cut back, here are some ideas to try.    Think before you drink.  Do you really want a drink, or is it just a habit? If you're used to having a drink at a certain time, try doing something else then.     Look for substitutes.  Find some no-alcohol drinks that you enjoy, like flavored seltzer water, tea with honey, or tonic with a slice of lime. Or try alcohol-free beer or \"virgin\" cocktails (without the alcohol).     Drink more water.  Use water to quench your thirst. Drink a glass of water before you have any alcohol. Have another glass along with every drink or between drinks.     Shrink your drink.  For example, have a bottle of beer instead of a pint. Use a smaller glass for wine. Choose drinks with lower alcohol content (ABV%). Or use less liquor and more mixer in cocktails.     Slow down.  It's easy to drink quickly and without thinking about it. Pay attention, and make each drink last longer.     Do the math.  Total up how much you spend on alcohol each month. How much is that a year? If you cut back, what could you do with the money you save?     Take a break.  Choose a day or two each week when you won't drink at all. Notice how you feel on those days, physically and emotionally. How did you sleep? Do you feel better? Over time, add more break days.     Count calories.  Would you like to lose some weight? For some people that's a good motivator for cutting back. Figure out how many calories are in each drink. How many does that add up to in a day? In a week? In a month?     Practice saying no.  Be ready when someone offers you a drink. Try: \"Thanks, I've had enough.\" Or \"Thanks, but I'm cutting back.\" Or \"No, thanks. I feel better when I drink less.\"   Current as of: August 20, 2024  Content Version: 14.4  © 7005-2072 [employer] [facility].   Care instructions adapted under license by

## 2025-05-13 ENCOUNTER — CLINICAL DOCUMENTATION (OUTPATIENT)
Dept: SPIRITUAL SERVICES | Age: 70
End: 2025-05-13

## 2025-05-13 NOTE — ACP (ADVANCE CARE PLANNING)
Advance Care Planning   Ambulatory ACP Specialist Patient Outreach    Date:  5/13/2025    ACP Specialist:  Erendira Ferro MA    Outreach call to patient in follow-up to ACP Specialist referral from:Rakesh Pederson MD    [x] PCP  [] Provider   [] Ambulatory Care Management [] Other     For:                  [x] Advance Directive Assistance              [] Complete Portable DNR order              [] Complete POST/POLST/MOST              [] Code Status Discussion             [] Discuss Goals of Care             [] Early ACP Decision-Making              [] Other (Specify)    Date Referral Received:05/12/2025    Next Step:   [x] ACP scheduled conversation  [] Outreach again in one week               [x] Email / Mail ACP Info Sheets  [x] Email / Mail Advance Directive   [] Closing referral.  Routing closure to referring provider/staff and to ACP Specialist .    [] Closure letter mailed to patient with invitation to contact ACP Specialist if / when ready.   [] Other (Specify here):       [x] At this time, Healthcare Decision Maker Is:       Primary Decision Maker: Alexa Sy - Spouse - [phone]      [] Primary agent named in scanned advance directive.    [x] Legal Next of Kin.     [] Unable to determine legal decision maker at this time.    Outreaches:       [x] 1st -  Date:  05/13/2025               Intervention:  [x] Spoke with Patient   [] Left Voice mail [x] Email / Mail    [] [employer]  [] Other (Specify) :     Outcomes:Writer contacted patient on home phone ([phone] ) to discuss ACP.  Patient is agreeable to a conversation with ACP Specialist Noy Alvarado on 05/27/2025 at 1:00 PM.  A copy of [place] [employer] forms and ACP information sheets sent to patient's confirmed email address on file with ACP Specialist and Coordinator's contact information included.              Thank you for this referral.

## 2025-05-22 ENCOUNTER — RESULTS FOLLOW-UP (OUTPATIENT)
Dept: FAMILY MEDICINE CLINIC | Age: 70
End: 2025-05-22

## 2025-05-22 ENCOUNTER — HOSPITAL ENCOUNTER (OUTPATIENT)
Dept: ULTRASOUND IMAGING | Age: 70
Discharge: HOME | End: 2025-05-22
Payer: MEDICARE

## 2025-05-22 ENCOUNTER — HOSPITAL ENCOUNTER (OUTPATIENT)
Dept: CT IMAGING | Age: 70
Discharge: HOME | End: 2025-05-22
Payer: MEDICARE

## 2025-05-22 ENCOUNTER — CLINICAL DOCUMENTATION (OUTPATIENT)
Dept: SPIRITUAL SERVICES | Age: 70
End: 2025-05-22

## 2025-05-22 DIAGNOSIS — Z87.891 PERSONAL HISTORY OF TOBACCO USE: ICD-10-CM

## 2025-05-22 PROCEDURE — 71271 CT THORAX LUNG CANCER SCR C-: CPT

## 2025-05-22 PROCEDURE — 76706 US ABDL AORTA SCREEN AAA: CPT

## 2025-05-22 NOTE — ACP (ADVANCE CARE PLANNING)
Advance Care Planning   Ambulatory ACP Specialist Patient Outreach    Date:  5/22/2025    ACP Specialist:  ANTELMO Hollis    Outreach call to patient in follow-up to ACP Specialist referral from:Rakesh Pederson MD    [x] PCP  [] Provider   [] Ambulatory Care Management [] Other     For:                  [x] Advance Directive Assistance              [] Complete Portable DNR order              [] Complete POST/POLST/MOST              [] Code Status Discussion             [] Discuss Goals of Care             [] Early ACP Decision-Making              [] Other (Specify)    Date Referral Received:05/12/2025    Next Step:   [x] ACP scheduled conversation  [] Outreach again in one week               [] Email / Mail ACP Info Sheets  [] Email / Mail Advance Directive   [] Closing referral.  Routing closure to referring provider/staff and to ACP Specialist .    [] Closure letter mailed to patient with invitation to contact ACP Specialist if / when ready.   [] Other (Specify here):       [x] At this time, Healthcare Decision Maker Is:         [] Primary agent named in scanned advance directive.    [x] Legal Next of Kin.     [] Unable to determine legal decision maker at this time.    Outreaches:                 [x]  Additional Outreach -  Date:  05/22/2025   (Specify Dates & special circumstances):    Outcomes: Reminder call placed to [phone] re: ACP visit set for Tues May 27 at 1pm. No answer; unable to leave VM. Will continue to follow as planned.     ADDENDUM:  Pt returned call and SW introduced self. Pt confirmed he remains available for ACP visit and has received the ACP info.       Thank you for this referral.

## 2025-05-27 ENCOUNTER — CLINICAL DOCUMENTATION (OUTPATIENT)
Age: 70
End: 2025-05-27

## 2025-05-27 NOTE — ACP (ADVANCE CARE PLANNING)
Documentation:  I communicated with the patient and/or health care decision maker about ACP.     Oliver Gonzalez was seen through a synchronous (real-time) audio encounter. Patient identification was verified at the start of the visit. This visit was conducted with the patient's (and/or legal guardian's) verbal consent.  The patient was located at Home: [address] [id number].  The provider was located at Home (Appt Dept State): [place].  Confirm you are appropriately licensed, registered, or certified to deliver care in the state where the patient is located as indicated above. If you are not or unsure, please re-schedule the visit: Yes, I confirm.     Total time spent for this encounter: 15      Oliver Gonzalez is a 69 y.o. male evaluated via telephone on 5/27/2025.    ANTELMO Hollis    An electronic signature was used to authenticate this note.       Advance Care Planning    Advance Care Planning Clinical Specialist  Conversation Note    Date of ACP Conversation: 5/27/2025    ACP Clinical Specialist: ANTELMO Hollis    Referral Date:05/12/2025    Received by: PCP    Conversation Conducted with: Patient with decision making capacity      Current Designated Decision Maker/s:    Primary Decision Maker: Alexa Sy - Spouse - [phone]      Care Preferences Communicated    Code Status:  If the patient's heart were to stop beating, in their present state of health, the patient's CPR Preference: Yes, attempt CPR and pt remains full code.    If their health worsens and it becomes clear that the chance of recovery is unlikely, the patient's CPR Preference: Yes, attempt CPR and pt is considering options for the future but needs more time to consider his wishes.       Ventilator:  If the patient, in their present state of health, suddenly became very ill and unable to breathe on their own, the patient desires the use of a ventilator (intubation).    If their health worsens and it becomes clear that the

## 2025-07-21 ENCOUNTER — TELEPHONE (OUTPATIENT)
Dept: FAMILY MEDICINE CLINIC | Age: 70
End: 2025-07-21

## 2025-07-21 DIAGNOSIS — I10 HYPERTENSION, ESSENTIAL: ICD-10-CM

## 2025-07-21 RX ORDER — HYDROCHLOROTHIAZIDE 25 MG/1
25 TABLET ORAL EVERY MORNING
Qty: 90 TABLET | Refills: 1 | Status: SHIPPED | OUTPATIENT
Start: 2025-07-21

## 2025-07-21 NOTE — TELEPHONE ENCOUNTER
Medication and Quantity requested:   hydroCHLOROthiazide (HYDRODIURIL) 25 MG tablet [[id number]]      Last Visit  5/12/25    Pharmacy and phone number updated in EPIC:  yes    [id number] - [facility] [address] - P [phone] - F [phone]

## 2025-08-05 ENCOUNTER — OFFICE VISIT (OUTPATIENT)
Dept: FAMILY MEDICINE CLINIC | Age: 70
End: 2025-08-05

## 2025-08-05 VITALS
OXYGEN SATURATION: 96 % | TEMPERATURE: 97.6 F | SYSTOLIC BLOOD PRESSURE: 122 MMHG | WEIGHT: 170 LBS | HEART RATE: 79 BPM | DIASTOLIC BLOOD PRESSURE: 70 MMHG | BODY MASS INDEX: 26.63 KG/M2

## 2025-08-05 DIAGNOSIS — T67.5XXD HEAT EXHAUSTION, SUBSEQUENT ENCOUNTER: Primary | ICD-10-CM
